# Patient Record
Sex: FEMALE | Race: WHITE | Employment: UNEMPLOYED | URBAN - METROPOLITAN AREA
[De-identification: names, ages, dates, MRNs, and addresses within clinical notes are randomized per-mention and may not be internally consistent; named-entity substitution may affect disease eponyms.]

---

## 2017-01-20 PROCEDURE — 87624 HPV HI-RISK TYP POOLED RSLT: CPT | Performed by: OBSTETRICS & GYNECOLOGY

## 2017-01-20 PROCEDURE — 88175 CYTOPATH C/V AUTO FLUID REDO: CPT | Performed by: OBSTETRICS & GYNECOLOGY

## 2017-02-08 RX ORDER — LEVOTHYROXINE SODIUM 0.03 MG/1
TABLET ORAL
Qty: 30 TABLET | Refills: 11 | Status: SHIPPED | OUTPATIENT
Start: 2017-02-08 | End: 2018-01-29

## 2017-02-21 ENCOUNTER — OFFICE VISIT (OUTPATIENT)
Dept: INTERNAL MEDICINE CLINIC | Facility: CLINIC | Age: 42
End: 2017-02-21

## 2017-02-21 VITALS
SYSTOLIC BLOOD PRESSURE: 120 MMHG | WEIGHT: 146 LBS | OXYGEN SATURATION: 99 % | DIASTOLIC BLOOD PRESSURE: 80 MMHG | RESPIRATION RATE: 12 BRPM | BODY MASS INDEX: 21.62 KG/M2 | HEIGHT: 69 IN | HEART RATE: 81 BPM

## 2017-02-21 DIAGNOSIS — Z00.00 ROUTINE PHYSICAL EXAMINATION: Primary | ICD-10-CM

## 2017-02-21 PROCEDURE — 99396 PREV VISIT EST AGE 40-64: CPT | Performed by: INTERNAL MEDICINE

## 2017-02-21 NOTE — PROGRESS NOTES
HPI:   Zoey Thomas is a 35year old female who presents for a complete physical exam.    Wt Readings from Last 6 Encounters:  02/21/17 : 146 lb  01/20/17 : 144 lb  12/19/16 : 145 lb  02/09/16 : 141 lb 12.8 oz  02/02/16 : 141 lb  11/30/15 : 144 lb ocular migraine   • Insomnia    • Shoulder injury 1/4/2008     right   • Obsessive-compulsive personality disorder    • Superficial phlebitis 6/17/2008     thrombosed, lower extremities   • Varicose veins 5/22/2008   • Migraine headache 7/20/2011   • Major 146 lb  BMI 21.55 kg/m2  SpO2 99%  Body mass index is 21.55 kg/(m^2).     GENERAL: well developed, well nourished,in no apparent distress  SKIN: no rashes,no suspicious lesions  HEENT: atraumatic, normocephalic,TM intact no erythema, oropharynx clear, poste

## 2017-03-18 ENCOUNTER — LAB ENCOUNTER (OUTPATIENT)
Dept: LAB | Facility: HOSPITAL | Age: 42
End: 2017-03-18
Attending: INTERNAL MEDICINE
Payer: COMMERCIAL

## 2017-03-18 DIAGNOSIS — Z00.00 ROUTINE PHYSICAL EXAMINATION: ICD-10-CM

## 2017-03-18 LAB
ALBUMIN SERPL-MCNC: 3.6 G/DL (ref 3.5–4.8)
ALP LIVER SERPL-CCNC: 43 U/L (ref 37–98)
ALT SERPL-CCNC: 30 U/L (ref 14–54)
AST SERPL-CCNC: 33 U/L (ref 15–41)
BASOPHILS # BLD AUTO: 0.01 X10(3) UL (ref 0–0.1)
BASOPHILS NFR BLD AUTO: 0.2 %
BILIRUB SERPL-MCNC: 0.5 MG/DL (ref 0.1–2)
BUN BLD-MCNC: 13 MG/DL (ref 8–20)
CALCIUM BLD-MCNC: 8.7 MG/DL (ref 8.3–10.3)
CHLORIDE: 107 MMOL/L (ref 101–111)
CHOLEST SMN-MCNC: 153 MG/DL (ref ?–200)
CO2: 28 MMOL/L (ref 22–32)
CREAT BLD-MCNC: 0.86 MG/DL (ref 0.55–1.02)
EOSINOPHIL # BLD AUTO: 0 X10(3) UL (ref 0–0.3)
EOSINOPHIL NFR BLD AUTO: 0 %
ERYTHROCYTE [DISTWIDTH] IN BLOOD BY AUTOMATED COUNT: 12.5 % (ref 11.5–16)
GLUCOSE BLD-MCNC: 91 MG/DL (ref 70–99)
HCT VFR BLD AUTO: 39.5 % (ref 34–50)
HDLC SERPL-MCNC: 90 MG/DL (ref 45–?)
HDLC SERPL: 1.7 {RATIO} (ref ?–4.44)
HGB BLD-MCNC: 13 G/DL (ref 12–16)
IMMATURE GRANULOCYTE COUNT: 0 X10(3) UL (ref 0–1)
IMMATURE GRANULOCYTE RATIO %: 0 %
LDLC SERPL CALC-MCNC: 56 MG/DL (ref ?–130)
LYMPHOCYTES # BLD AUTO: 2.16 X10(3) UL (ref 0.9–4)
LYMPHOCYTES NFR BLD AUTO: 52.8 %
M PROTEIN MFR SERPL ELPH: 7 G/DL (ref 6.1–8.3)
MCH RBC QN AUTO: 32.3 PG (ref 27–33.2)
MCHC RBC AUTO-ENTMCNC: 32.9 G/DL (ref 31–37)
MCV RBC AUTO: 98.3 FL (ref 81–100)
MONOCYTES # BLD AUTO: 0.44 X10(3) UL (ref 0.1–0.6)
MONOCYTES NFR BLD AUTO: 10.8 %
NEUTROPHIL ABS PRELIM: 1.48 X10 (3) UL (ref 1.3–6.7)
NEUTROPHILS # BLD AUTO: 1.48 X10(3) UL (ref 1.3–6.7)
NEUTROPHILS NFR BLD AUTO: 36.2 %
NONHDLC SERPL-MCNC: 63 MG/DL (ref ?–130)
PLATELET # BLD AUTO: 243 10(3)UL (ref 150–450)
POTASSIUM SERPL-SCNC: 4.1 MMOL/L (ref 3.6–5.1)
RBC # BLD AUTO: 4.02 X10(6)UL (ref 3.8–5.1)
RED CELL DISTRIBUTION WIDTH-SD: 45 FL (ref 35.1–46.3)
SODIUM SERPL-SCNC: 141 MMOL/L (ref 136–144)
TRIGLYCERIDES: 35 MG/DL (ref ?–150)
TSI SER-ACNC: 2.35 MIU/ML (ref 0.35–5.5)
VLDL: 7 MG/DL (ref 5–40)
WBC # BLD AUTO: 4.1 X10(3) UL (ref 4–13)

## 2017-03-18 PROCEDURE — 85025 COMPLETE CBC W/AUTO DIFF WBC: CPT

## 2017-03-18 PROCEDURE — 36415 COLL VENOUS BLD VENIPUNCTURE: CPT

## 2017-03-18 PROCEDURE — 80061 LIPID PANEL: CPT

## 2017-03-18 PROCEDURE — 84443 ASSAY THYROID STIM HORMONE: CPT

## 2017-03-18 PROCEDURE — 80053 COMPREHEN METABOLIC PANEL: CPT

## 2017-05-18 RX ORDER — RIZATRIPTAN BENZOATE 10 MG/1
TABLET ORAL
Qty: 9 TABLET | Refills: 1 | Status: SHIPPED | OUTPATIENT
Start: 2017-05-18 | End: 2017-08-21

## 2017-05-18 NOTE — TELEPHONE ENCOUNTER
Last OV pertinent to medication: 2/21/2017  Last refill date: 1/27/2016     #/refills: 9/5  When pt was asked to return for OV: 1 year  Upcoming appt/reason: none

## 2017-08-21 ENCOUNTER — TELEPHONE (OUTPATIENT)
Dept: INTERNAL MEDICINE CLINIC | Facility: CLINIC | Age: 42
End: 2017-08-21

## 2017-08-21 ENCOUNTER — OFFICE VISIT (OUTPATIENT)
Dept: INTERNAL MEDICINE CLINIC | Facility: CLINIC | Age: 42
End: 2017-08-21

## 2017-08-21 VITALS
HEIGHT: 69 IN | DIASTOLIC BLOOD PRESSURE: 80 MMHG | BODY MASS INDEX: 21.62 KG/M2 | HEART RATE: 68 BPM | TEMPERATURE: 98 F | WEIGHT: 146 LBS | SYSTOLIC BLOOD PRESSURE: 120 MMHG | OXYGEN SATURATION: 99 %

## 2017-08-21 DIAGNOSIS — R19.7 DIARRHEA, UNSPECIFIED TYPE: Primary | ICD-10-CM

## 2017-08-21 PROCEDURE — 99213 OFFICE O/P EST LOW 20 MIN: CPT | Performed by: INTERNAL MEDICINE

## 2017-08-21 NOTE — TELEPHONE ENCOUNTER
Spoke with pt. States that for the past 6 days she has been having 1-4 loose stools daily. Denies bleeding/black stools. Denies fever, nausea, vomiting, abdominal cramping.  Pt denies being out of the country but states that she may have eaten questionable

## 2017-08-21 NOTE — PROGRESS NOTES
Harriet Denise is a 38year old female.   HPI:   CC: diarrhea started last Tuesday  Came back from trip from [place] 1 week ago  This morning watery stools 4 to 5 times   Ate blueberry muffin, coffee, banana, yogurt yesterday  Today had yogurt, ban 7/20/2011   • Obsessive-compulsive personality disorder    • Onychomycosis 5/26/2011    chronic-8/13/2009   • Postpartum depression    • Seasonal allergies    • Shoulder injury 1/4/2008    right   • Superficial phlebitis 6/17/2008    thrombosed, lower extr encounter. There are no Patient Instructions on file for this visit. No Follow-up on file.

## 2017-08-21 NOTE — TELEPHONE ENCOUNTER
Patient phoned and advised that she has had diarrhea for 6 days that is staying pretty consistent, and she stated she may have \"salmonella\" food poisoning. Patient advised she is now getting concerned because she feels she may be getting dehydrated.   Pa

## 2017-09-26 ENCOUNTER — HOSPITAL ENCOUNTER (OUTPATIENT)
Dept: PHYSICAL THERAPY | Facility: HOSPITAL | Age: 42
Setting detail: THERAPIES SERIES
Discharge: HOME | End: 2017-09-26
Attending: ORTHOPAEDIC SURGERY
Payer: COMMERCIAL

## 2017-09-26 DIAGNOSIS — M70.61 TROCHANTERIC BURSITIS OF RIGHT HIP: ICD-10-CM

## 2017-09-26 DIAGNOSIS — M76.31 ILIOTIBIAL BAND SYNDROME OF RIGHT SIDE: ICD-10-CM

## 2017-09-26 PROCEDURE — 97162 PT EVAL MOD COMPLEX 30 MIN: CPT

## 2017-09-26 PROCEDURE — 97110 THERAPEUTIC EXERCISES: CPT

## 2017-09-26 NOTE — PROGRESS NOTES
LOWER EXTREMITY EVALUATION:   Referring Physician: Dr. La Harris  Diagnosis: R trochanteric bursitis, ITB syndrome     Date of Service: 9/26/2017     PATIENT SUMMARY   Saint Dame is a 38year old female who presents to therapy today with complaints o above impairments to decrease pain and return to prior level of function.     Precautions:  None  OBJECTIVE:   Gait: increased hip IR with stance phase, R>L  Palpation: TTP R greater trochanter  Sensation: intact to light touch  Myotomes: 5/5 L2-S1    AROM: total of 10 visits over a 90 day period. Treatment will include: Manual Therapy; Therapeutic Exercises; Neuromuscular Re-education;  Therapeutic Activity; Gait Training; Pt education; Home exercise program instructions    Education or treatment limitation:

## 2017-09-29 ENCOUNTER — HOSPITAL ENCOUNTER (OUTPATIENT)
Dept: PHYSICAL THERAPY | Facility: HOSPITAL | Age: 42
Setting detail: THERAPIES SERIES
Discharge: HOME | End: 2017-09-29
Attending: ORTHOPAEDIC SURGERY
Payer: COMMERCIAL

## 2017-09-29 PROCEDURE — 97110 THERAPEUTIC EXERCISES: CPT

## 2017-09-29 PROCEDURE — 97140 MANUAL THERAPY 1/> REGIONS: CPT

## 2017-09-29 NOTE — PROGRESS NOTES
Dx: R trochanteric bursitis, ITB syndrome            Next MD visit: none scheduled  Fall Risk: standard         Precautions: n/a             Subjective: Patient reports that her hip has felt fine since initial evaluation but that she has not tried running.

## 2017-10-03 ENCOUNTER — HOSPITAL ENCOUNTER (OUTPATIENT)
Dept: PHYSICAL THERAPY | Facility: HOSPITAL | Age: 42
Setting detail: THERAPIES SERIES
Discharge: HOME | End: 2017-10-03
Attending: ORTHOPAEDIC SURGERY
Payer: COMMERCIAL

## 2017-10-03 PROCEDURE — 97140 MANUAL THERAPY 1/> REGIONS: CPT

## 2017-10-03 PROCEDURE — 97110 THERAPEUTIC EXERCISES: CPT

## 2017-10-03 NOTE — PROGRESS NOTES
Dx: R trochanteric bursitis, ITB syndrome            Next MD visit: none scheduled  Fall Risk: standard         Precautions: n/a             Subjective: Patient reports that she feels only a very slight pain with walking but did feel a sharp pain when spri Treadmill running, 10 min, 6.5 X, 5 min         Prone hip PA w/ mob belt passive extension         1/2 kneel hip flexor stretch, 3x20 sec         glute med STM                   Charges: Danny 2( 25 min) manual x 1 ( 20 min)   Total Timed Treatment: 45 min

## 2017-10-06 ENCOUNTER — APPOINTMENT (OUTPATIENT)
Dept: PHYSICAL THERAPY | Facility: HOSPITAL | Age: 42
End: 2017-10-06
Attending: ORTHOPAEDIC SURGERY
Payer: COMMERCIAL

## 2017-10-10 ENCOUNTER — HOSPITAL ENCOUNTER (OUTPATIENT)
Dept: PHYSICAL THERAPY | Facility: HOSPITAL | Age: 42
Setting detail: THERAPIES SERIES
Discharge: HOME | End: 2017-10-10
Attending: ORTHOPAEDIC SURGERY
Payer: COMMERCIAL

## 2017-10-10 PROCEDURE — 97110 THERAPEUTIC EXERCISES: CPT

## 2017-10-10 PROCEDURE — 97140 MANUAL THERAPY 1/> REGIONS: CPT

## 2017-10-10 NOTE — PROGRESS NOTES
Dx: R trochanteric bursitis, ITB syndrome            Next MD visit: none scheduled  Fall Risk: standard         Precautions: n/a             Subjective: Patient was able to run on the treadmill for 3 miles on Friday without pain but then tried to run [employer] figure 4 hip PA, gr III+ Cable lateral walk, 7#, 10 Side lying hip abductor stretch, manual, 3x30 sec       Side lying hip abduction, 3x12 X Standing glute med stretch, 3x20 sec       Single leg bridge, 3x8 X Standing hip abduction in single leg, green TB,

## 2017-10-13 ENCOUNTER — APPOINTMENT (OUTPATIENT)
Dept: PHYSICAL THERAPY | Facility: HOSPITAL | Age: 42
End: 2017-10-13
Attending: ORTHOPAEDIC SURGERY
Payer: COMMERCIAL

## 2017-10-17 ENCOUNTER — HOSPITAL ENCOUNTER (OUTPATIENT)
Dept: PHYSICAL THERAPY | Facility: HOSPITAL | Age: 42
Setting detail: THERAPIES SERIES
Discharge: HOME | End: 2017-10-17
Attending: ORTHOPAEDIC SURGERY
Payer: COMMERCIAL

## 2017-10-17 PROCEDURE — 97140 MANUAL THERAPY 1/> REGIONS: CPT

## 2017-10-17 PROCEDURE — 97110 THERAPEUTIC EXERCISES: CPT

## 2017-10-17 NOTE — PROGRESS NOTES
Dx: R trochanteric bursitis, ITB syndrome            Next MD visit: none scheduled  Fall Risk: standard         Precautions: n/a             Subjective: Patient has been able to run on the treadmill up to 4 miles with no pain.  She does feel slight soreness 3x12 X Standing glute med stretch, 3x20 sec airex single leg march      Single leg bridge, 3x8 X Standing hip abduction in single leg, green TB, 2x10 airex single leg partial squat      2 leg lowering, 3x8 Long axis distraction manipulation Shuttle single

## 2017-10-24 ENCOUNTER — HOSPITAL ENCOUNTER (OUTPATIENT)
Dept: PHYSICAL THERAPY | Facility: HOSPITAL | Age: 42
Setting detail: THERAPIES SERIES
Discharge: HOME | End: 2017-10-24
Attending: ORTHOPAEDIC SURGERY
Payer: COMMERCIAL

## 2017-10-24 PROCEDURE — 97110 THERAPEUTIC EXERCISES: CPT

## 2017-10-24 PROCEDURE — 97140 MANUAL THERAPY 1/> REGIONS: CPT

## 2017-10-24 NOTE — PROGRESS NOTES
Dx: R trochanteric bursitis, ITB syndrome            Next MD visit: none scheduled  Fall Risk: standard         Precautions: n/a             Subjective: Patient has been able to run up to 6 miles, including running outside, but does have increased soreness bridge, 3x8 X Standing hip abduction in single leg, green TB, 2x10 airex single leg partial squat Walking lunge, 3 laps     2 leg lowering, 3x8 Long axis distraction manipulation Shuttle single leg jump, 37#, 2x15 BOSU single leg march Split lunge, 2x10

## 2017-10-31 ENCOUNTER — HOSPITAL ENCOUNTER (OUTPATIENT)
Dept: PHYSICAL THERAPY | Facility: HOSPITAL | Age: 42
Setting detail: THERAPIES SERIES
Discharge: HOME | End: 2017-10-31
Attending: ORTHOPAEDIC SURGERY
Payer: COMMERCIAL

## 2017-10-31 PROCEDURE — 97110 THERAPEUTIC EXERCISES: CPT

## 2017-10-31 PROCEDURE — 97140 MANUAL THERAPY 1/> REGIONS: CPT

## 2017-10-31 NOTE — PROGRESS NOTES
Dx: R trochanteric bursitis, ITB syndrome            Next MD visit: none scheduled  Fall Risk: standard         Precautions: n/a             Subjective: Patient had muscle soreness for 3 days following previous session but has less hip and low back pain.  Mago Aguillon leg partial squat Walking lunge, 3 laps X    2 leg lowering, 3x8 Long axis distraction manipulation Shuttle single leg jump, 37#, 2x15 BOSU single leg march Split lunge, 2x10 X, 2x10    Treadmill running, 10 min, 6.5 X, 5 min Single leg bridge, 3x10 BOSU s

## 2017-11-16 ENCOUNTER — OFFICE VISIT (OUTPATIENT)
Dept: INTERNAL MEDICINE CLINIC | Facility: CLINIC | Age: 42
End: 2017-11-16

## 2017-11-16 VITALS
HEIGHT: 69 IN | OXYGEN SATURATION: 99 % | BODY MASS INDEX: 22.22 KG/M2 | HEART RATE: 84 BPM | SYSTOLIC BLOOD PRESSURE: 100 MMHG | DIASTOLIC BLOOD PRESSURE: 70 MMHG | TEMPERATURE: 98 F | WEIGHT: 150 LBS

## 2017-11-16 DIAGNOSIS — J01.90 ACUTE SINUSITIS, RECURRENCE NOT SPECIFIED, UNSPECIFIED LOCATION: Primary | ICD-10-CM

## 2017-11-16 PROCEDURE — 99213 OFFICE O/P EST LOW 20 MIN: CPT | Performed by: INTERNAL MEDICINE

## 2017-11-16 RX ORDER — LEVOFLOXACIN 500 MG/1
500 TABLET, FILM COATED ORAL DAILY
Qty: 10 TABLET | Refills: 0 | Status: SHIPPED | OUTPATIENT
Start: 2017-11-16 | End: 2017-11-26

## 2017-11-16 NOTE — PROGRESS NOTES
Eli Vazquez is a 38year old female.   HPI:   CC: sinus symptoms  Using flonase  Last week, ear itching both ears  Increase ibuprofen  Feels pressure in ears/sinus  Lots of postnasal drip  Cough+  No sob  No fever          ALL:  Cat Dander [Dander] 2/4/2011    ocular migraine   • Varicose veins 5/22/2008     Past Surgical History:  2016: BREAST SURGERY      Comment: revision of bilat breast augmentation  1999: BREAST SURGERY PROCEDURE UNLISTED      Comment: breast augmentation  2013: BREAST SURGERY P (500 mg total) by mouth daily. Imaging & Consults:  None        No orders of the defined types were placed in this encounter. There are no Patient Instructions on file for this visit. No Follow-up on file.

## 2017-11-27 ENCOUNTER — HOSPITAL ENCOUNTER (EMERGENCY)
Facility: HOSPITAL | Age: 42
Discharge: HOME | End: 2017-11-27
Attending: EMERGENCY MEDICINE
Payer: COMMERCIAL

## 2017-11-27 ENCOUNTER — APPOINTMENT (OUTPATIENT)
Dept: GENERAL RADIOLOGY | Facility: HOSPITAL | Age: 42
End: 2017-11-27
Attending: EMERGENCY MEDICINE
Payer: COMMERCIAL

## 2017-11-27 ENCOUNTER — TELEPHONE (OUTPATIENT)
Dept: INTERNAL MEDICINE CLINIC | Facility: CLINIC | Age: 42
End: 2017-11-27

## 2017-11-27 VITALS
TEMPERATURE: 98 F | HEIGHT: 70 IN | BODY MASS INDEX: 21.47 KG/M2 | RESPIRATION RATE: 18 BRPM | HEART RATE: 70 BPM | WEIGHT: 150 LBS | OXYGEN SATURATION: 100 % | SYSTOLIC BLOOD PRESSURE: 126 MMHG | DIASTOLIC BLOOD PRESSURE: 79 MMHG

## 2017-11-27 DIAGNOSIS — M79.10 MUSCLE PAIN: Primary | ICD-10-CM

## 2017-11-27 PROCEDURE — 81025 URINE PREGNANCY TEST: CPT

## 2017-11-27 PROCEDURE — 99284 EMERGENCY DEPT VISIT MOD MDM: CPT

## 2017-11-27 PROCEDURE — 80048 BASIC METABOLIC PNL TOTAL CA: CPT | Performed by: EMERGENCY MEDICINE

## 2017-11-27 PROCEDURE — 80076 HEPATIC FUNCTION PANEL: CPT | Performed by: EMERGENCY MEDICINE

## 2017-11-27 PROCEDURE — 73030 X-RAY EXAM OF SHOULDER: CPT | Performed by: EMERGENCY MEDICINE

## 2017-11-27 PROCEDURE — 73080 X-RAY EXAM OF ELBOW: CPT | Performed by: EMERGENCY MEDICINE

## 2017-11-27 PROCEDURE — 36415 COLL VENOUS BLD VENIPUNCTURE: CPT

## 2017-11-27 PROCEDURE — 85025 COMPLETE CBC W/AUTO DIFF WBC: CPT | Performed by: EMERGENCY MEDICINE

## 2017-11-27 PROCEDURE — 82550 ASSAY OF CK (CPK): CPT | Performed by: EMERGENCY MEDICINE

## 2017-11-27 NOTE — ED PROVIDER NOTES
Patient Seen in: [facility] Emergency Department    History   Patient presents with:  Upper Extremity Injury (musculoskeletal)    Stated Complaint: arm pain after taking levaquin     HPI    Dario Javed is a pleasant 52-year-old female presenting t oz/week     Comment: 2-3 glasses wine weekly      Review of Systems    Positive for stated complaint: arm pain after taking levaquin   Other systems are as noted in HPI. Constitutional and vital signs reviewed.       All other systems reviewed and negative ---------                               -----------         ------                     CBC W/ DIFFERENTIAL[[id number]]          Normal              Final result                 Please view results for these tests on the individual orders.    POCT PREGNANCY

## 2017-11-27 NOTE — TELEPHONE ENCOUNTER
Pt called, upset and tearful. States that she finished the 10 days of Levaquin prescribed for her sinus symptoms on Saturday. States that sinus SX done. States that by Thursday she noticed some shoulder and elbow discomfort.   States that now her discomfor

## 2017-12-01 ENCOUNTER — TELEPHONE (OUTPATIENT)
Dept: INTERNAL MEDICINE CLINIC | Facility: CLINIC | Age: 42
End: 2017-12-01

## 2017-12-01 NOTE — TELEPHONE ENCOUNTER
Patient went to the [facility] Monday for a negative reaction to an antibiotic and is calling for a follow up.

## 2017-12-04 NOTE — TELEPHONE ENCOUNTER
Left message for patient to call back to schedule an ER/FU appointment with either Dr. Dwaine Farmer or Damon Meyers for this week.

## 2017-12-12 NOTE — TELEPHONE ENCOUNTER
Just FYI - patient cancelled her appt today for reaction to levaquin as she much better. She is going to give it another week as in her words it is a night and day difference from yesterday.

## 2017-12-27 ENCOUNTER — TELEPHONE (OUTPATIENT)
Dept: OTHER | Facility: CLINIC | Age: 42
End: 2017-12-27

## 2017-12-27 DIAGNOSIS — M79.644 PAIN OF FINGER OF RIGHT HAND: Primary | ICD-10-CM

## 2018-01-29 RX ORDER — LEVOTHYROXINE SODIUM 0.03 MG/1
TABLET ORAL
Qty: 30 TABLET | Refills: 1 | Status: SHIPPED | OUTPATIENT
Start: 2018-01-29 | End: 2018-04-10

## 2018-01-29 NOTE — TELEPHONE ENCOUNTER
Last OV relevant to medication: 11/16/17 Acute issue   Last refill date: 2/8/17     #/refills: 30 tabs + 1 refill   When pt was asked to return for OV: No mention follow up needed   Upcoming appt/reason: no upcoming appt     TSH   Date Value   03/18/2017 2

## 2018-03-21 ENCOUNTER — OFFICE VISIT (OUTPATIENT)
Dept: INTERNAL MEDICINE CLINIC | Facility: CLINIC | Age: 43
End: 2018-03-21

## 2018-03-21 VITALS
HEIGHT: 69 IN | OXYGEN SATURATION: 95 % | HEART RATE: 75 BPM | WEIGHT: 150 LBS | DIASTOLIC BLOOD PRESSURE: 70 MMHG | BODY MASS INDEX: 22.22 KG/M2 | SYSTOLIC BLOOD PRESSURE: 130 MMHG

## 2018-03-21 DIAGNOSIS — J32.9 RECURRENT SINUSITIS: Primary | ICD-10-CM

## 2018-03-21 PROCEDURE — 99213 OFFICE O/P EST LOW 20 MIN: CPT | Performed by: INTERNAL MEDICINE

## 2018-03-21 RX ORDER — FLUCONAZOLE 150 MG/1
150 TABLET ORAL ONCE
Qty: 1 TABLET | Refills: 0 | Status: SHIPPED | OUTPATIENT
Start: 2018-03-21 | End: 2018-03-21

## 2018-03-21 RX ORDER — CEFDINIR 300 MG/1
CAPSULE ORAL
Qty: 42 CAPSULE | Refills: 0 | Status: SHIPPED | OUTPATIENT
Start: 2018-03-21 | End: 2018-05-01 | Stop reason: ALTCHOICE

## 2018-03-21 RX ORDER — BENZONATATE 100 MG/1
CAPSULE ORAL
Qty: 40 CAPSULE | Refills: 0 | Status: SHIPPED | OUTPATIENT
Start: 2018-03-21 | End: 2018-05-01 | Stop reason: ALTCHOICE

## 2018-03-21 NOTE — PROGRESS NOTES
Alina Brownlee is a 38year old female.   HPI:   CC: sinus pressure and right ear pressure for 5 days  Pt has been using saline rinse emily pot TID, flonase  Sudafed  Last dose at noon  Her mucus is thick yellow  She is leaving to [place] on Sunday  Pt insertion   • Hypothyroidism    • Insomnia    • Major depressive disorder, recurrent episode, severe, without mention of psychotic behavior 10/12/2012   • Migraine headache 7/20/2011   • Obsessive-compulsive personality disorder    • Onychomycosis 5/26/201 murmur  LUNGS: clear to auscultation, breathing nonlabored    ASSESSMENT AND PLAN:   Recurrent sinusitis  (primary encounter diagnosis)- can do emily pot once a day while on vacation  Stay on flonase  Start cefdinir 300 bid for 21 days  Diflucan given in c

## 2018-03-24 ENCOUNTER — HOSPITAL ENCOUNTER (OUTPATIENT)
Age: 43
Discharge: HOME | End: 2018-03-24
Attending: FAMILY MEDICINE
Payer: COMMERCIAL

## 2018-03-24 VITALS
DIASTOLIC BLOOD PRESSURE: 94 MMHG | OXYGEN SATURATION: 98 % | HEIGHT: 69 IN | HEART RATE: 79 BPM | RESPIRATION RATE: 16 BRPM | BODY MASS INDEX: 21.48 KG/M2 | SYSTOLIC BLOOD PRESSURE: 137 MMHG | WEIGHT: 145 LBS | TEMPERATURE: 99 F

## 2018-03-24 DIAGNOSIS — R59.0 CERVICAL LYMPHADENOPATHY: ICD-10-CM

## 2018-03-24 DIAGNOSIS — J01.00 ACUTE NON-RECURRENT MAXILLARY SINUSITIS: Primary | ICD-10-CM

## 2018-03-24 PROCEDURE — 99204 OFFICE O/P NEW MOD 45 MIN: CPT

## 2018-03-24 PROCEDURE — 99213 OFFICE O/P EST LOW 20 MIN: CPT

## 2018-03-24 RX ORDER — PREDNISONE 20 MG/1
TABLET ORAL
Qty: 10 TABLET | Refills: 0 | Status: SHIPPED | OUTPATIENT
Start: 2018-03-24 | End: 2018-05-01 | Stop reason: ALTCHOICE

## 2018-03-24 NOTE — ED PROVIDER NOTES
Patient Seen in: [facility]    History   Patient presents with:  Sinus Problem  Swollen Glands    Stated Complaint: SINUS INFECTION- POSSIBLE MED REACTION?     HPI    This 38-year-old female presents to the office for rechec augmentation  1999: BREAST SURGERY PROCEDURE UNLISTED      Comment: breast augmentation  2013: BREAST SURGERY PROCEDURE UNLISTED      Comment: breast augmentation    Family history reviewed and is not pertinent to presenting problem.     Smoking status: [employer] Symptomatic treatment is reviewed with the patient. I will give her 5 days of prednisone 40 mg once daily. Usage and side effects are reviewed. She should continue the PEDERSEN BRIGID as prescribed by her primary doctor. Travel precautions reviewed.   The

## 2018-03-24 NOTE — ED INITIAL ASSESSMENT (HPI)
Pt arrived alert and responsive. Pt c/o sinus pain and swollen neck glands. Pt states she was seen by PMD on  Wednesday and prescribed cefdinir pt states she doesn't feel better.

## 2018-04-10 RX ORDER — LEVOTHYROXINE SODIUM 0.03 MG/1
TABLET ORAL
Qty: 30 TABLET | Refills: 0 | Status: SHIPPED | OUTPATIENT
Start: 2018-04-10 | End: 2018-05-02

## 2018-04-10 NOTE — TELEPHONE ENCOUNTER
Last OV relevant to medication: 02/21/17  Last refill date: 01/29/18   #/refills: #30 / 1 refill  When pt was asked to return for OV: CPX in one year  Upcoming appt/reason: None (see below)    Called the patient and informed her she is overdue for her year

## 2018-05-01 ENCOUNTER — OFFICE VISIT (OUTPATIENT)
Dept: INTERNAL MEDICINE CLINIC | Facility: CLINIC | Age: 43
End: 2018-05-01

## 2018-05-01 VITALS
BODY MASS INDEX: 22.22 KG/M2 | DIASTOLIC BLOOD PRESSURE: 80 MMHG | HEART RATE: 74 BPM | OXYGEN SATURATION: 97 % | HEIGHT: 69 IN | RESPIRATION RATE: 12 BRPM | WEIGHT: 150 LBS | SYSTOLIC BLOOD PRESSURE: 120 MMHG

## 2018-05-01 DIAGNOSIS — M77.12 LATERAL EPICONDYLITIS OF LEFT ELBOW: ICD-10-CM

## 2018-05-01 DIAGNOSIS — E03.9 ACQUIRED HYPOTHYROIDISM: ICD-10-CM

## 2018-05-01 DIAGNOSIS — B35.3 TINEA PEDIS OF BOTH FEET: ICD-10-CM

## 2018-05-01 DIAGNOSIS — M75.82 ROTATOR CUFF TENDINITIS, LEFT: ICD-10-CM

## 2018-05-01 DIAGNOSIS — J30.1 SEASONAL ALLERGIC RHINITIS DUE TO POLLEN: Primary | ICD-10-CM

## 2018-05-01 PROCEDURE — 99215 OFFICE O/P EST HI 40 MIN: CPT | Performed by: INTERNAL MEDICINE

## 2018-05-01 NOTE — PROGRESS NOTES
Jd Gonzalez is a 38year old female.   HPI:   CC: thyroid check and sinus recheck  Blowing yellow still from upper sinus  Doing emily pot, decongestant, flonase  Pt was given steroid last visit for sinus infection and 21 days of  Cefdinir  Saw Dr Yani Lundberg recurrent episode, severe, without mention of psychotic behavior 10/12/2012   • Migraine headache 7/20/2011   • Obsessive-compulsive personality disorder    • Onychomycosis 5/26/2011    chronic-8/13/2009   • Postpartum depression    • Seasonal allergies [id number] no S3S4 no murmur  LUNGS: clear to auscultation,   GI: Soft+BS NT ND,no masses, no HSM, no abdominal bruit , no hernia,   extrem- no swelling , decreased abduction left shoulder to 75 degrees  Lateral epicondyl mild tenderness  Peeling skin plantar

## 2018-05-02 ENCOUNTER — APPOINTMENT (OUTPATIENT)
Dept: LAB | Facility: HOSPITAL | Age: 43
End: 2018-05-02
Attending: INTERNAL MEDICINE
Payer: COMMERCIAL

## 2018-05-02 DIAGNOSIS — E03.9 ACQUIRED HYPOTHYROIDISM: ICD-10-CM

## 2018-05-02 PROCEDURE — 84443 ASSAY THYROID STIM HORMONE: CPT

## 2018-05-02 PROCEDURE — 36415 COLL VENOUS BLD VENIPUNCTURE: CPT

## 2018-09-11 PROCEDURE — 88175 CYTOPATH C/V AUTO FLUID REDO: CPT | Performed by: OBSTETRICS & GYNECOLOGY

## 2019-01-06 ENCOUNTER — HOSPITAL ENCOUNTER (OUTPATIENT)
Age: 44
Discharge: HOME | End: 2019-01-06
Attending: FAMILY MEDICINE
Payer: COMMERCIAL

## 2019-01-06 VITALS
RESPIRATION RATE: 16 BRPM | BODY MASS INDEX: 21.48 KG/M2 | TEMPERATURE: 99 F | HEART RATE: 84 BPM | HEIGHT: 69 IN | SYSTOLIC BLOOD PRESSURE: 125 MMHG | WEIGHT: 145 LBS | DIASTOLIC BLOOD PRESSURE: 85 MMHG | OXYGEN SATURATION: 98 %

## 2019-01-06 DIAGNOSIS — R19.7 ACUTE DIARRHEA: Primary | ICD-10-CM

## 2019-01-06 LAB
#LYMPHOCYTE IC: 2.1 X10ˆ3/UL (ref 0.9–3.2)
#MXD IC: 0.4 X10ˆ3/UL (ref 0.1–1)
#NEUTROPHIL IC: 2.9 X10ˆ3/UL (ref 1.3–6.7)
CREAT SERPL-MCNC: 0.7 MG/DL (ref 0.55–1.02)
GLUCOSE BLD-MCNC: 100 MG/DL (ref 70–99)
HCT IC: 41.8 % (ref 37–54)
HGB IC: 13.6 G/DL (ref 11.7–16)
ISTAT BUN: 7 MG/DL (ref 8–20)
ISTAT CHLORIDE: 102 MMOL/L (ref 101–111)
ISTAT HEMATOCRIT: 42 % (ref 34–50)
ISTAT IONIZED CALCIUM: 1.15 MMOL/L
ISTAT POTASSIUM: 4.2 MMOL/L (ref 3.6–5.1)
ISTAT SODIUM: 140 MMOL/L (ref 136–144)
LYMPHOCYTES NFR BLD AUTO: 39 %
MCH IC: 31.9 PG (ref 27–33.2)
MCHC IC: 32.5 G/DL (ref 31–37)
MCV IC: 97.9 FL (ref 81–100)
MIXED CELL %: 8.3 %
NEUTROPHILS NFR BLD AUTO: 52.7 %
PLT IC: 267 X10ˆ3/UL (ref 150–450)
RBC IC: 4.27 X10ˆ6/UL (ref 3.8–5.1)
WBC IC: 5.4 X10ˆ3/UL (ref 4–13)

## 2019-01-06 PROCEDURE — 85025 COMPLETE CBC W/AUTO DIFF WBC: CPT | Performed by: FAMILY MEDICINE

## 2019-01-06 PROCEDURE — 80047 BASIC METABLC PNL IONIZED CA: CPT

## 2019-01-06 PROCEDURE — 99213 OFFICE O/P EST LOW 20 MIN: CPT

## 2019-01-06 PROCEDURE — 36415 COLL VENOUS BLD VENIPUNCTURE: CPT

## 2019-01-06 NOTE — ED PROVIDER NOTES
Patient Seen in: [address] [facility]    History   Patient presents with:  Stomach Pain    Stated Complaint: stomach issues since xmas    HPI    75-year-old female presents with chief complaint of diarrhea for the past 10-12 days.   Jacklyn Sterling presenting problem. Social History    Tobacco Use      Smoking status: Never Smoker      Smokeless tobacco: Never Used    Alcohol use:  Yes      Alcohol/week: 0.0 oz      Comment: 2-3 glasses wine weekly    Drug use: No      Review of Systems    Positive diagnosis)    Disposition:  Discharge  1/6/2019 10:20 am    Follow-up:  Melissa Farrell  [address] [facility] [address] [phone]    Schedule an appointment as soon as possible for a visit in 1 week          Medications Prescribed

## 2019-01-06 NOTE — ED INITIAL ASSESSMENT (HPI)
Pt c/o stomach pain. States she had a bad stomach flu 12/23-12/25. Pt has been drinking a lot of water -  will eat in the morning and then within a couple hours will have to go to the bathroom emergently. Pt states some is formed and some is loose.

## 2019-04-22 ENCOUNTER — TELEPHONE (OUTPATIENT)
Dept: INTERNAL MEDICINE CLINIC | Facility: CLINIC | Age: 44
End: 2019-04-22

## 2019-04-22 RX ORDER — LEVOTHYROXINE SODIUM 0.03 MG/1
TABLET ORAL
Qty: 30 TABLET | Refills: 0 | OUTPATIENT
Start: 2019-04-22

## 2019-04-24 RX ORDER — LEVOTHYROXINE SODIUM 0.03 MG/1
TABLET ORAL
Qty: 30 TABLET | Refills: 0 | Status: SHIPPED | OUTPATIENT
Start: 2019-04-24 | End: 2019-05-21

## 2019-04-24 NOTE — TELEPHONE ENCOUNTER
Rx approved. Last OV May 2018 with Dr. Orlin Mtz. Pt agrees to continue here with Dr. Teresa Perdomo.  Made PE appt for 4/29/19 10am.

## 2019-04-24 NOTE — TELEPHONE ENCOUNTER
Pt following up regarding refill request stated she only has one left. Preferred Pharmacy: [facility] [address], [address] [facility] Meredith Bazan [phone], [phone]. Please advise. Thank you!

## 2019-04-29 ENCOUNTER — OFFICE VISIT (OUTPATIENT)
Dept: INTERNAL MEDICINE CLINIC | Facility: CLINIC | Age: 44
End: 2019-04-29
Payer: COMMERCIAL

## 2019-04-29 VITALS
RESPIRATION RATE: 14 BRPM | OXYGEN SATURATION: 99 % | TEMPERATURE: 98 F | WEIGHT: 152.63 LBS | SYSTOLIC BLOOD PRESSURE: 110 MMHG | HEIGHT: 69 IN | BODY MASS INDEX: 22.61 KG/M2 | DIASTOLIC BLOOD PRESSURE: 70 MMHG | HEART RATE: 84 BPM

## 2019-04-29 DIAGNOSIS — Z13.1 SCREENING FOR DIABETES MELLITUS: ICD-10-CM

## 2019-04-29 DIAGNOSIS — Z13.220 SCREENING FOR LIPOID DISORDERS: ICD-10-CM

## 2019-04-29 DIAGNOSIS — Z13.0 SCREENING FOR IRON DEFICIENCY ANEMIA: ICD-10-CM

## 2019-04-29 DIAGNOSIS — Z00.00 ENCOUNTER FOR ANNUAL PHYSICAL EXAM: Primary | ICD-10-CM

## 2019-04-29 DIAGNOSIS — Z23 NEED FOR VACCINATION: ICD-10-CM

## 2019-04-29 DIAGNOSIS — Z13.228 SCREENING FOR METABOLIC DISORDER: ICD-10-CM

## 2019-04-29 DIAGNOSIS — Z13.29 SCREENING FOR THYROID DISORDER: ICD-10-CM

## 2019-04-29 DIAGNOSIS — Z13.89 SCREENING FOR GENITOURINARY CONDITION: ICD-10-CM

## 2019-04-29 DIAGNOSIS — E55.9 VITAMIN D DEFICIENCY: ICD-10-CM

## 2019-04-29 PROCEDURE — 99396 PREV VISIT EST AGE 40-64: CPT | Performed by: STUDENT IN AN ORGANIZED HEALTH CARE EDUCATION/TRAINING PROGRAM

## 2019-04-29 PROCEDURE — 90471 IMMUNIZATION ADMIN: CPT | Performed by: STUDENT IN AN ORGANIZED HEALTH CARE EDUCATION/TRAINING PROGRAM

## 2019-04-29 PROCEDURE — 90715 TDAP VACCINE 7 YRS/> IM: CPT | Performed by: STUDENT IN AN ORGANIZED HEALTH CARE EDUCATION/TRAINING PROGRAM

## 2019-04-29 NOTE — PATIENT INSTRUCTIONS
Prevention Guidelines, Women Ages 36 to 52  Screening tests and vaccines are an important part of managing your health. A screening test is done to find possible disorders or diseases in people who don't have any symptoms.  The goal is to find a disease e Depression All women in this age group At routine exams   Gonorrhea Sexually active women at increased risk for infection At routine exams   Hepatitis C Anyone at increased risk; 1 time for those born between [facility] At routine exams   High cholester Meningococcal Women at increased risk for infection–talk with your healthcare provider 1 or more doses   Pneumococcal conjugate vaccine (PCV13) and pneumococcal polysaccharide vaccine (PPSV23) Women at increased risk for infection–talk with your healthcare

## 2019-04-29 NOTE — PROGRESS NOTES
[facility]    REASON FOR VISIT:    Lynnette Velazquez is a 37year old female who presents for an [address]. This is a new patient to me.    Patient's medications, allergies, past medical, surgical, social and family histories we Preventive Services for Which Recommendations Vary with Risk Recommendation Internal Lab or Procedure External Lab or Procedure   Cholesterol Screening Recommended screening varies with age, risk and gender LDL Cholesterol (mg/dL)   Date Value   03/18/ Rfl:    Rizatriptan Benzoate (MAXALT) 10 MG Oral Tab TAKE 1 TABLET BY MOUTH RIGHT AWAY, MAY REPEAT IN 2 HOURS ONCE Disp: 9 tablet Rfl: 5   fluticasone (FLONASE) 50 MCG/ACT Nasal Suspension 2 sprays in each nostril daily Disp: 1 Bottle Rfl: 3      MEDICAL I pain.    Eyes: Negative for pain and visual disturbance. Respiratory: Negative for cough and shortness of breath. Cardiovascular: Negative for chest pain and palpitations. Gastrointestinal: Negative for nausea, vomiting, abdominal pain and diarrhea. pulses. Lymphadenopathy: She has no cervical adenopathy or supraclavicular adenopathy. Neurological: She is alert and oriented to person, place, and time. Cranial nerves are intact,motor and sensory are grossly intact. She has normal reflexes.    Skin: S (14); Future    Screening for iron deficiency anemia  -     CBC WITH DIFFERENTIAL WITH PLATELET;  Future    Need for vaccination  -     TETANUS, DIPHTHERIA TOXOIDS AND ACELLULAR PERTUSIS VACCINE (TDAP), >7 YEARS, IM USE    Screening for genitourinary condit

## 2019-05-22 RX ORDER — LEVOTHYROXINE SODIUM 0.03 MG/1
TABLET ORAL
Qty: 30 TABLET | Refills: 0 | Status: SHIPPED | OUTPATIENT
Start: 2019-05-22 | End: 2019-06-20

## 2019-05-22 NOTE — TELEPHONE ENCOUNTER
Last OV relevant to medication: 4/29/19 PE  Last refill date: 4/24/19     #/refills: 30/0  When pt was asked to return for OV: 1 year PE  Upcoming appt/reason: none  TSH   Date Value   05/02/2018 2.150 mIU/mL   09/12/2013 2.04 mIU/L     TSH W/REFLEX TO FT4

## 2019-05-31 ENCOUNTER — OFFICE VISIT (OUTPATIENT)
Dept: INTERNAL MEDICINE CLINIC | Facility: CLINIC | Age: 44
End: 2019-05-31
Payer: COMMERCIAL

## 2019-05-31 VITALS
WEIGHT: 151 LBS | SYSTOLIC BLOOD PRESSURE: 122 MMHG | BODY MASS INDEX: 22.36 KG/M2 | OXYGEN SATURATION: 98 % | DIASTOLIC BLOOD PRESSURE: 76 MMHG | HEART RATE: 75 BPM | TEMPERATURE: 99 F | HEIGHT: 69 IN | RESPIRATION RATE: 16 BRPM

## 2019-05-31 DIAGNOSIS — J01.00 ACUTE MAXILLARY SINUSITIS, RECURRENCE NOT SPECIFIED: Primary | ICD-10-CM

## 2019-05-31 PROCEDURE — 99213 OFFICE O/P EST LOW 20 MIN: CPT | Performed by: STUDENT IN AN ORGANIZED HEALTH CARE EDUCATION/TRAINING PROGRAM

## 2019-05-31 RX ORDER — PREDNISONE 20 MG/1
20 TABLET ORAL 2 TIMES DAILY
Qty: 14 TABLET | Refills: 0 | Status: SHIPPED | OUTPATIENT
Start: 2019-05-31 | End: 2019-06-07

## 2019-05-31 RX ORDER — AMOXICILLIN AND CLAVULANATE POTASSIUM 875; 125 MG/1; MG/1
1 TABLET, FILM COATED ORAL 2 TIMES DAILY
Qty: 20 TABLET | Refills: 0 | Status: SHIPPED | OUTPATIENT
Start: 2019-05-31 | End: 2019-06-10

## 2019-05-31 NOTE — PROGRESS NOTES
HPI:    Patient ID: Asad Olvera is a 37year old female. Sinus Problem   This is a new problem. The current episode started in the past 7 days. The problem has been gradually worsening since onset. There has been no fever.  Her pain is at a pia capsule (150 mg total) by mouth daily. Disp: 30 capsule Rfl: 5   busPIRone HCl 5 MG Oral Tab Take 1 tablet (5 mg total) by mouth 3 (three) times daily.  Disp: 90 tablet Rfl: 5   ALPRAZolam 0.25 MG Oral Tab TAKE ONE TO TWO TABLETS BY MOUTH EVERY 4 TO 6 HOURS person, place, and time. She has normal reflexes. Skin: Skin is warm and dry. Psychiatric: She has a normal mood and affect. Her behavior is normal. Judgment and thought content normal.   Nursing note and vitals reviewed.              ASSESSMENT/PLAN:

## 2019-05-31 NOTE — PATIENT INSTRUCTIONS
Self-Care for Sinusitis     Drinking plenty of water can help sinuses drain. Sinusitis can often be managed with self-care. Self-care can keep sinuses moist and make you feel more comfortable. Remember to follow your doctor's instructions closely.  This Colds, flu, and allergies make it more likely for you to get sinusitis. Do your best to prevent sinusitis by preventing these problems. Do what you can to avoid getting colds and other infections. Stay away from things that cause allergies (allergens).  Vega Jimenez · Stay away from all types of smoke, which dries out sinus linings. This includes tobacco smoke and chemical smoke in workplace settings. · Use saltwater rinses. Date Last Reviewed: 10/1/2016  © 7053-9237 [employer].  [address] [facility] · You can use an over-the-counter decongestant, unless a similar medicine was prescribed to you. Nasal sprays work the fastest. Use one that contains phenylephrine or oxymetazoline. First blow your nose gently. Then use the spray.  Do not use these medicine · Don’t have close contact with people who have sore throats, colds, or other upper respiratory infections. · Don’t smoke, and stay away from secondhand smoke. · Stay up to date with of your vaccines.   Date Last Reviewed: 11/1/2017  © 7083-9932 [employer]

## 2019-06-10 ENCOUNTER — TELEPHONE (OUTPATIENT)
Dept: INTERNAL MEDICINE CLINIC | Facility: CLINIC | Age: 44
End: 2019-06-10

## 2019-06-10 NOTE — TELEPHONE ENCOUNTER
Pt saw Dr Yanni Infante 5-31 with sinus infection, still has sx, super clogged, congested, head ache. Only has 2 pills left, wondering if she should get refill of antibiotics, please advise.  Thank you

## 2019-06-10 NOTE — TELEPHONE ENCOUNTER
PSR - pt needs re-eval either here or [facility]. Cannot refill abx. Thanks! \"Return for If symptoms worsen or fail to improve. \"

## 2019-06-11 ENCOUNTER — OFFICE VISIT (OUTPATIENT)
Dept: INTERNAL MEDICINE CLINIC | Facility: CLINIC | Age: 44
End: 2019-06-11
Payer: COMMERCIAL

## 2019-06-11 VITALS
HEIGHT: 69 IN | SYSTOLIC BLOOD PRESSURE: 122 MMHG | HEART RATE: 81 BPM | BODY MASS INDEX: 22.22 KG/M2 | DIASTOLIC BLOOD PRESSURE: 78 MMHG | TEMPERATURE: 99 F | RESPIRATION RATE: 18 BRPM | WEIGHT: 150 LBS

## 2019-06-11 DIAGNOSIS — J01.00 ACUTE NON-RECURRENT MAXILLARY SINUSITIS: Primary | ICD-10-CM

## 2019-06-11 DIAGNOSIS — R09.81 SINUS CONGESTION: ICD-10-CM

## 2019-06-11 PROCEDURE — 99213 OFFICE O/P EST LOW 20 MIN: CPT | Performed by: NURSE PRACTITIONER

## 2019-06-11 RX ORDER — PREDNISONE 20 MG/1
40 TABLET ORAL DAILY
Qty: 10 TABLET | Refills: 0 | Status: SHIPPED | OUTPATIENT
Start: 2019-06-11 | End: 2019-06-28 | Stop reason: ALTCHOICE

## 2019-06-11 NOTE — PROGRESS NOTES
Patient presents with: Follow - Up: Pt states she does not feel like sinus infections is clear. Pt states she feel better but still concern infection is not over.        HPI:  Presents for follow up of recent visit with Dr. Carmen Paul for URI symptoms on 5/3 Hypothyroidism     Anxiety disorder     Chronic sinusitis     History of diabetes mellitus     Major depressive disorder, recurrent episode ([facility] [address]     Insomnia     Eczema     Conversion disorder     Adjustment disorder with anxiety     Obsessive-compulsive Conjunctivae are pink and moist without exudate or drainage. Lymphadenopathy: No cervical adenopathy. Cardiovascular: Normal rate, regular rhythm. No murmur. Pulmonary/Chest: No respiratory distress. Effort normal. Breath sounds clear bilaterally.  No

## 2019-06-11 NOTE — PATIENT INSTRUCTIONS
Try a premixed saline nasal spray, available over the counter, such as Ocean Nasal Spray, 4 times daily (may use every 2-3 hours if desired). Do two sprays each nostril and gently blow nose after.

## 2019-06-20 DIAGNOSIS — E03.9 ACQUIRED HYPOTHYROIDISM: Primary | ICD-10-CM

## 2019-06-21 RX ORDER — LEVOTHYROXINE SODIUM 0.03 MG/1
TABLET ORAL
Qty: 7 TABLET | Refills: 0 | Status: SHIPPED | OUTPATIENT
Start: 2019-06-21 | End: 2019-06-27

## 2019-06-21 NOTE — TELEPHONE ENCOUNTER
Last OV relevant to medication: 6/11/19  Last refill date: 5/22/19     #/refills: 30/0  When pt was asked to return for OV: none noted   Upcoming appt/reason: none    Left detailed message per HIPAA. Made aware to have lab work done as ordered.   30 days p

## 2019-06-21 NOTE — TELEPHONE ENCOUNTER
Patient returned message left. States she has been sick and unable to have labs drawn (saw Philip Howell 6/11/19 for sinusitis). States will run out medication before Monday. Advised message would be routed to physician, may not change course.  Patient states unabl

## 2019-06-21 NOTE — TELEPHONE ENCOUNTER
Spoke with pt. Made aware rx refilled for only 1 week to have labs drawn. Pt stated will have done asap.

## 2019-06-26 ENCOUNTER — LAB ENCOUNTER (OUTPATIENT)
Dept: LAB | Facility: HOSPITAL | Age: 44
End: 2019-06-26
Attending: STUDENT IN AN ORGANIZED HEALTH CARE EDUCATION/TRAINING PROGRAM
Payer: COMMERCIAL

## 2019-06-26 DIAGNOSIS — Z13.228 SCREENING FOR METABOLIC DISORDER: ICD-10-CM

## 2019-06-26 DIAGNOSIS — Z13.0 SCREENING FOR IRON DEFICIENCY ANEMIA: ICD-10-CM

## 2019-06-26 DIAGNOSIS — Z13.220 SCREENING FOR LIPOID DISORDERS: ICD-10-CM

## 2019-06-26 DIAGNOSIS — Z13.29 SCREENING FOR THYROID DISORDER: ICD-10-CM

## 2019-06-26 DIAGNOSIS — Z13.89 SCREENING FOR GENITOURINARY CONDITION: ICD-10-CM

## 2019-06-26 DIAGNOSIS — Z13.1 SCREENING FOR DIABETES MELLITUS: ICD-10-CM

## 2019-06-26 DIAGNOSIS — E55.9 VITAMIN D DEFICIENCY: ICD-10-CM

## 2019-06-26 PROCEDURE — 84443 ASSAY THYROID STIM HORMONE: CPT

## 2019-06-26 PROCEDURE — 85025 COMPLETE CBC W/AUTO DIFF WBC: CPT

## 2019-06-26 PROCEDURE — 36415 COLL VENOUS BLD VENIPUNCTURE: CPT

## 2019-06-26 PROCEDURE — 80061 LIPID PANEL: CPT

## 2019-06-26 PROCEDURE — 83036 HEMOGLOBIN GLYCOSYLATED A1C: CPT

## 2019-06-26 PROCEDURE — 82306 VITAMIN D 25 HYDROXY: CPT

## 2019-06-26 PROCEDURE — 80053 COMPREHEN METABOLIC PANEL: CPT

## 2019-06-26 PROCEDURE — 81003 URINALYSIS AUTO W/O SCOPE: CPT

## 2019-06-27 ENCOUNTER — TELEPHONE (OUTPATIENT)
Dept: INTERNAL MEDICINE CLINIC | Facility: CLINIC | Age: 44
End: 2019-06-27

## 2019-06-27 DIAGNOSIS — E03.9 ACQUIRED HYPOTHYROIDISM: ICD-10-CM

## 2019-06-27 RX ORDER — LEVOTHYROXINE SODIUM 0.03 MG/1
TABLET ORAL
Qty: 90 TABLET | Refills: 0 | Status: SHIPPED | OUTPATIENT
Start: 2019-06-27 | End: 2019-09-21

## 2019-06-27 NOTE — TELEPHONE ENCOUNTER
Patient was put on amoxicillin and prednisone starting 5/31/9 then took another round of prednisone on 6/11/19 from our office.   The patient had a upset stomach from this, thought it was the antibiotics, then thought maybe because it was from her menstrual

## 2019-06-27 NOTE — TELEPHONE ENCOUNTER
Received return phone call from patient. Patient states she has been on antibiotics and prednisone and her stomach has been \"upset\" for 2 or 3 weeks now, she thinks because of the medication.  Patient states she took a probiotic, but it has not really hel

## 2019-06-28 ENCOUNTER — OFFICE VISIT (OUTPATIENT)
Dept: INTERNAL MEDICINE CLINIC | Facility: CLINIC | Age: 44
End: 2019-06-28
Payer: COMMERCIAL

## 2019-06-28 VITALS
BODY MASS INDEX: 22.22 KG/M2 | HEART RATE: 76 BPM | DIASTOLIC BLOOD PRESSURE: 60 MMHG | WEIGHT: 150 LBS | SYSTOLIC BLOOD PRESSURE: 106 MMHG | HEIGHT: 69 IN | OXYGEN SATURATION: 99 % | TEMPERATURE: 98 F | RESPIRATION RATE: 14 BRPM

## 2019-06-28 DIAGNOSIS — R10.84 ABDOMINAL DISCOMFORT, GENERALIZED: Primary | ICD-10-CM

## 2019-06-28 DIAGNOSIS — R73.03 PREDIABETES: ICD-10-CM

## 2019-06-28 PROCEDURE — 99214 OFFICE O/P EST MOD 30 MIN: CPT | Performed by: STUDENT IN AN ORGANIZED HEALTH CARE EDUCATION/TRAINING PROGRAM

## 2019-06-28 RX ORDER — PANTOPRAZOLE SODIUM 40 MG/1
40 TABLET, DELAYED RELEASE ORAL
Qty: 30 TABLET | Refills: 0 | Status: SHIPPED | OUTPATIENT
Start: 2019-06-28 | End: 2020-07-29 | Stop reason: ALTCHOICE

## 2019-06-28 NOTE — PROGRESS NOTES
[facility]    REASON FOR VISIT:    Current Complaints: Patient presents with:  Stomach Pain: not liquid or solid but looser stools, stomahc feels swollen, yesterday she went to the bathroom 4-5 times      HPI:  Jd Manivy is a 37 year o ONE TIME DAILY Disp: 90 tablet Rfl: 0   ALPRAZolam 0.25 MG Oral Tab TAKE 1-2 TABLETS EVERY 4-6 HOURS AS NEEDED Disp: 120 tablet Rfl: 0   Zolpidem Tartrate 10 MG Oral Tab TAKE ONE TABLET BY MOUTH NIGHTLY AS NEEDED FOR SLEEP Disp: 30 tablet Rfl: 5   Venlafax Appears stated age, nourished, and pleasant. Vital signs reviewed as noted   Head: Normocephalic and atraumatic. HEENT: Nose normal. TMs pearly gray, + light reflex. B/l external auditory canals without erythema or edema.   Mucous membranes moist, dentiti family history of type 2 diabetes mellitus: Both parents have type 2 diabetes. Patient was reassured but this number can definitely be changed to lower if she continues healthy lifestyle and regular exercise.       Medications side effects, risk and benefi

## 2019-09-21 DIAGNOSIS — E03.9 ACQUIRED HYPOTHYROIDISM: ICD-10-CM

## 2019-09-24 RX ORDER — LEVOTHYROXINE SODIUM 0.03 MG/1
TABLET ORAL
Qty: 90 TABLET | Refills: 2 | Status: SHIPPED | OUTPATIENT
Start: 2019-09-24 | End: 2019-12-23

## 2019-12-21 DIAGNOSIS — E03.9 ACQUIRED HYPOTHYROIDISM: ICD-10-CM

## 2019-12-23 RX ORDER — LEVOTHYROXINE SODIUM 0.03 MG/1
TABLET ORAL
Qty: 90 TABLET | Refills: 1 | Status: SHIPPED | OUTPATIENT
Start: 2019-12-23 | End: 2020-06-15

## 2020-06-15 DIAGNOSIS — E03.9 ACQUIRED HYPOTHYROIDISM: ICD-10-CM

## 2020-06-15 RX ORDER — LEVOTHYROXINE SODIUM 0.03 MG/1
25 TABLET ORAL DAILY
Qty: 90 TABLET | Refills: 0 | Status: SHIPPED | OUTPATIENT
Start: 2020-06-15 | End: 2020-09-18

## 2020-07-29 ENCOUNTER — OFFICE VISIT (OUTPATIENT)
Dept: INTERNAL MEDICINE CLINIC | Facility: CLINIC | Age: 45
End: 2020-07-29
Payer: COMMERCIAL

## 2020-07-29 VITALS
SYSTOLIC BLOOD PRESSURE: 124 MMHG | HEIGHT: 69 IN | BODY MASS INDEX: 22.66 KG/M2 | TEMPERATURE: 97 F | RESPIRATION RATE: 14 BRPM | DIASTOLIC BLOOD PRESSURE: 80 MMHG | WEIGHT: 153 LBS | HEART RATE: 90 BPM | OXYGEN SATURATION: 98 %

## 2020-07-29 DIAGNOSIS — Z13.220 SCREENING FOR CHOLESTEROL LEVEL: ICD-10-CM

## 2020-07-29 DIAGNOSIS — H60.391 OTHER INFECTIVE ACUTE OTITIS EXTERNA OF RIGHT EAR: Primary | ICD-10-CM

## 2020-07-29 DIAGNOSIS — Z13.29 SCREENING FOR THYROID DISORDER: ICD-10-CM

## 2020-07-29 DIAGNOSIS — Z00.00 PHYSICAL EXAM: ICD-10-CM

## 2020-07-29 PROCEDURE — 3008F BODY MASS INDEX DOCD: CPT | Performed by: NURSE PRACTITIONER

## 2020-07-29 PROCEDURE — 3079F DIAST BP 80-89 MM HG: CPT | Performed by: NURSE PRACTITIONER

## 2020-07-29 PROCEDURE — 99213 OFFICE O/P EST LOW 20 MIN: CPT | Performed by: NURSE PRACTITIONER

## 2020-07-29 PROCEDURE — 3074F SYST BP LT 130 MM HG: CPT | Performed by: NURSE PRACTITIONER

## 2020-07-29 NOTE — PATIENT INSTRUCTIONS
Get your labs done. You should be fasting for at least 10 hours. If you take a multivitamin with Biotin or any biotin product it should be held for 3 days prior to getting your labs done.  Call  to make an apt    Follow up in one week as needed

## 2020-07-29 NOTE — PROGRESS NOTES
Erick Pedraza is a 50year old female. CHIEF COMPLAINT   Right ear pain    HPI:   The patient reports the pain started on Sunday to her right ear. She states it is intermittent, sharp 10/10.  She was outside on Saturday and using her ear buds and no headache 7/20/2011   • Obsessive-compulsive personality disorder [facility])    • Onychomycosis 5/26/2011    chronic-8/13/2009   • Postpartum depression    • Seasonal allergies    • Shoulder injury 1/4/2008    right   • Superficial phlebitis 6/17/2008    thrombo Value Date    GLU 92 06/26/2019    BUN 14 06/26/2019    BUNCREA 14.9 06/26/2019    CREATSERUM 0.94 06/26/2019    ANIONGAP 7 06/26/2019    GFR 70 11/27/2017    GFRNAA 74 06/26/2019    GFRAA 85 06/26/2019    CA 9.0 06/26/2019    OSMOCALC 298 (H) 06/26/2019

## 2020-09-17 DIAGNOSIS — E03.9 ACQUIRED HYPOTHYROIDISM: ICD-10-CM

## 2020-09-18 NOTE — TELEPHONE ENCOUNTER
LM's for patient to call back and schedule her Physical appointment to release refill request.      Stated to Complete Fasting Labs on Order Prior to Appointment.     Last OV relevant to medication: 7/29/2020  Last refill date:  6/15/2020    #/refills: 90/0

## 2020-09-23 RX ORDER — LEVOTHYROXINE SODIUM 0.03 MG/1
25 TABLET ORAL DAILY
Qty: 30 TABLET | Refills: 0 | Status: SHIPPED | OUTPATIENT
Start: 2020-09-23 | End: 2021-04-09

## 2021-03-04 ENCOUNTER — MED REC SCAN ONLY (OUTPATIENT)
Dept: INTERNAL MEDICINE CLINIC | Facility: CLINIC | Age: 46
End: 2021-03-04

## 2021-04-09 DIAGNOSIS — E03.9 ACQUIRED HYPOTHYROIDISM: ICD-10-CM

## 2021-04-09 RX ORDER — LEVOTHYROXINE SODIUM 0.03 MG/1
25 TABLET ORAL DAILY
Qty: 30 TABLET | Refills: 0 | Status: SHIPPED | OUTPATIENT
Start: 2021-04-09 | End: 2021-05-12

## 2021-04-09 NOTE — TELEPHONE ENCOUNTER
Patient given one time 30 day supply. Patient needs labs for further refills. Spoke to pt. Advised only 30 day supply given. Labs overdue since July 2020. Pt voiced understanding.   Scheduled pt for lab appt on 4/12/21 at 7:30AM.  Reminded to fast

## 2021-04-09 NOTE — TELEPHONE ENCOUNTER
Did the patient contact the pharmacy directly?:  Yes    Is patient out of meds or supply very low?:  Out of meds    Medication Requested:  LEVOTHYROXINE SODIUM 25 MCG Oral Tab    Is patient requesting a 30 or 90 day supply?:  90 day supply    Pharmacy name

## 2021-04-12 ENCOUNTER — LAB ENCOUNTER (OUTPATIENT)
Dept: LAB | Age: 46
End: 2021-04-12
Attending: NURSE PRACTITIONER
Payer: COMMERCIAL

## 2021-04-12 DIAGNOSIS — Z00.00 PHYSICAL EXAM: ICD-10-CM

## 2021-04-12 DIAGNOSIS — Z13.220 SCREENING FOR CHOLESTEROL LEVEL: ICD-10-CM

## 2021-04-12 DIAGNOSIS — Z13.29 SCREENING FOR THYROID DISORDER: ICD-10-CM

## 2021-04-12 PROCEDURE — 80050 GENERAL HEALTH PANEL: CPT | Performed by: NURSE PRACTITIONER

## 2021-04-12 PROCEDURE — 80061 LIPID PANEL: CPT | Performed by: NURSE PRACTITIONER

## 2021-04-12 PROCEDURE — 36415 COLL VENOUS BLD VENIPUNCTURE: CPT | Performed by: NURSE PRACTITIONER

## 2021-04-14 ENCOUNTER — OFFICE VISIT (OUTPATIENT)
Dept: INTERNAL MEDICINE CLINIC | Facility: CLINIC | Age: 46
End: 2021-04-14
Payer: COMMERCIAL

## 2021-04-14 VITALS
BODY MASS INDEX: 22.39 KG/M2 | OXYGEN SATURATION: 98 % | HEIGHT: 69 IN | DIASTOLIC BLOOD PRESSURE: 78 MMHG | SYSTOLIC BLOOD PRESSURE: 122 MMHG | HEART RATE: 76 BPM | TEMPERATURE: 98 F | RESPIRATION RATE: 16 BRPM | WEIGHT: 151.19 LBS

## 2021-04-14 DIAGNOSIS — Z00.00 ENCOUNTER FOR ANNUAL PHYSICAL EXAM: Primary | ICD-10-CM

## 2021-04-14 DIAGNOSIS — Z12.31 ENCOUNTER FOR SCREENING MAMMOGRAM FOR BREAST CANCER: ICD-10-CM

## 2021-04-14 PROCEDURE — 3008F BODY MASS INDEX DOCD: CPT | Performed by: STUDENT IN AN ORGANIZED HEALTH CARE EDUCATION/TRAINING PROGRAM

## 2021-04-14 PROCEDURE — 99396 PREV VISIT EST AGE 40-64: CPT | Performed by: STUDENT IN AN ORGANIZED HEALTH CARE EDUCATION/TRAINING PROGRAM

## 2021-04-14 PROCEDURE — 3074F SYST BP LT 130 MM HG: CPT | Performed by: STUDENT IN AN ORGANIZED HEALTH CARE EDUCATION/TRAINING PROGRAM

## 2021-04-14 PROCEDURE — 3078F DIAST BP <80 MM HG: CPT | Performed by: STUDENT IN AN ORGANIZED HEALTH CARE EDUCATION/TRAINING PROGRAM

## 2021-04-14 RX ORDER — TERBINAFINE HYDROCHLORIDE 250 MG/1
1 TABLET ORAL DAILY
COMMUNITY
Start: 2021-03-29

## 2021-04-14 NOTE — PROGRESS NOTES
[facility]    REASON FOR VISIT:    Zoey Thomas is a 50year old female who presents for an [address]. Current Complaints: feeling well. Reports compliance with the medications, denies any side effects.    Vaccinations: T Diabetes Screening  if history of high blood pressure or other  risk factors HgbA1C (%)   Date Value   06/26/2019 5.7 (H)     Glucose (mg/dL)   Date Value   04/12/2021 91     GLUCOSE (mg/dL)   Date Value   02/04/2016 92         Gonorrhea Screening if hig not taking: Reported on 4/14/2021) 9 tablet 5      MEDICAL INFORMATION:   Past Medical History:   Diagnosis Date   • Chronic sinusitis 6/8/2012 4/17/2012: recurrent   • Generalized anxiety disorder 10/12/2012   • Hip pain 5/11/2012    B/L, tendinitis; 7 for chest pain and palpitations. Gastrointestinal: Negative for nausea, vomiting, abdominal pain and diarrhea. Genitourinary: Negative for urgency, frequency and difficulty urinating. Musculoskeletal: Negative for arthralgias and gait problem.    Skin to pt's preference. Abdominal: Soft. Bowel sounds are normal. There is no tenderness. Musculoskeletal: Normal range of motion. She exhibits no edema. Extremities: no cyanosis, clubbing or edema, no varicosities or stasis change, 2+DP/PT pulses.   Lymph MIRYAM (CPT=77067/64116)       Continue healthy lifestyle    Discussed use of sunscreen, wearing seatbelt, recommend regular cardiovascular and weight bearing exercise as well as a well-rounded diet.      Her 5 year prevention plan includes: annual visits fo

## 2021-04-14 NOTE — PATIENT INSTRUCTIONS
Prevention Guidelines, Women Ages 36 to 52  Screening tests and vaccines are an important part of managing your health. A screening test is done to find diseases in people who don't have any symptoms.  The goal is to find a disease early so lifestyle mancilla sigmoidoscopy every 5 years, or  · Colonoscopy every 10 years, or  · CT colonography (virtual colonoscopy) every 5 years, or  · Yearly fecal occult blood test, or  · Yearly fecal immunochemical test every year, or  · Stool DNA test, every 3 years  If you c least 4 weeks after the first dose   Hepatitis A Women at increased risk for infection–talk with your healthcare provider 2 doses given 6 months apart   Hepatitis B Women at increased risk for infection–talk with your healthcare provider 3 doses over 6 mon American Academy of Ophthalmology  Tito last reviewed this educational content on 11/1/2017  © 9783-7171 [employer]. All rights reserved. This information is not intended as a substitute for professional medical care.  Always follow your

## 2021-04-23 ENCOUNTER — TELEMEDICINE (OUTPATIENT)
Dept: INTERNAL MEDICINE CLINIC | Facility: CLINIC | Age: 46
End: 2021-04-23

## 2021-04-23 ENCOUNTER — TELEPHONE (OUTPATIENT)
Dept: INTERNAL MEDICINE CLINIC | Facility: CLINIC | Age: 46
End: 2021-04-23

## 2021-04-23 VITALS — HEIGHT: 69 IN | HEART RATE: 68 BPM | TEMPERATURE: 100 F | BODY MASS INDEX: 22 KG/M2

## 2021-04-23 DIAGNOSIS — B34.9 ACUTE VIRAL SYNDROME: ICD-10-CM

## 2021-04-23 DIAGNOSIS — J01.00 ACUTE NON-RECURRENT MAXILLARY SINUSITIS: Primary | ICD-10-CM

## 2021-04-23 PROCEDURE — 99213 OFFICE O/P EST LOW 20 MIN: CPT | Performed by: NURSE PRACTITIONER

## 2021-04-23 RX ORDER — AMOXICILLIN AND CLAVULANATE POTASSIUM 875; 125 MG/1; MG/1
1 TABLET, FILM COATED ORAL 2 TIMES DAILY
Qty: 20 TABLET | Refills: 0 | Status: SHIPPED | OUTPATIENT
Start: 2021-04-23 | End: 2021-05-03

## 2021-04-23 NOTE — TELEPHONE ENCOUNTER
Virtual/Telephone Consent    Marinaadarsh Thomas verbally consents to a Virtual/Telephone Check-In service on 04/23/2021.   Patient understands and accepts financial responsibility for any deductible, co-insurance and/or co-pays associated with this servic

## 2021-04-23 NOTE — PROGRESS NOTES
Virtual video Check-In    Lynnette Velazquez verbally consents to a Virtual/video Check-In visit on 04/23/21. Patient has been referred to the [facility] website at [email or web address] to review the yearly Consent to Treat document.     Patient understand tablet (5 mg total) by mouth 3 (three) times daily. 90 tablet 5   • Venlafaxine HCl  MG Oral Capsule SR 24 Hr Take 1 capsule (150 mg total) by mouth daily.  30 capsule 5   • Zolpidem Tartrate 10 MG Oral Tab Take 1 tablet (10 mg total) by mouth nightly are able to phonate clearly without pausing due to sob, there was no coughing during the visit.   NEURO: Oriented times three      LABS:      Lab Results   Component Value Date    WBC 4.5 04/12/2021    RBC 3.95 04/12/2021    HGB 12.6 04/12/2021    HCT 40.3 and quarantine precautions until her PCR test result is in.  - Amoxicillin-Pot Clavulanate 875-125 MG Oral Tab; Take 1 tablet by mouth 2 (two) times daily for 10 days. Dispense: 20 tablet;  Refill: 0      The patient indicates understanding of these issues

## 2021-04-23 NOTE — PATIENT INSTRUCTIONS
Continue to quarantine until your PCR Covid test result is in. If negative you may discontinue quarantine. If positive follow CDC guidelines of 10 days from onset of symptoms and 3 days of improvement in your symptoms before discontinuing isolation.     Vinicio Gan You have a viral upper respiratory illness (URI), which is another term for the common cold. This illness is contagious during the first few days. It is spread through the air by coughing and sneezing.  It may also be spread by direct contact (touching the pressure.)  Follow-up care  Follow up with your healthcare provider, or as advised.   When to seek medical advice  Call your healthcare provider right away if any of these occur:  · Cough with lots of colored sputum (mucus)  · Severe headache; face, neck, o

## 2021-05-11 DIAGNOSIS — E03.9 ACQUIRED HYPOTHYROIDISM: ICD-10-CM

## 2021-05-12 RX ORDER — LEVOTHYROXINE SODIUM 0.03 MG/1
25 TABLET ORAL DAILY
Qty: 90 TABLET | Refills: 0 | Status: SHIPPED | OUTPATIENT
Start: 2021-05-12 | End: 2021-08-10

## 2021-05-12 NOTE — TELEPHONE ENCOUNTER
Last refill #30 on 4/9/2021  Last office visit pertaining to refill on 4/14/2021 - cpx  No future appointments.   Lab Results   Component Value Date    TSH 3.280 04/12/2021    TSHT4 2.46 02/04/2016     Refilled per protocol

## 2021-05-14 RX ORDER — RIZATRIPTAN BENZOATE 10 MG/1
TABLET ORAL
Qty: 9 TABLET | Refills: 0 | Status: SHIPPED | OUTPATIENT
Start: 2021-05-14

## 2021-06-18 ENCOUNTER — MED REC SCAN ONLY (OUTPATIENT)
Dept: INTERNAL MEDICINE CLINIC | Facility: CLINIC | Age: 46
End: 2021-06-18

## 2021-07-12 ENCOUNTER — HOSPITAL ENCOUNTER (OUTPATIENT)
Dept: MAMMOGRAPHY | Facility: HOSPITAL | Age: 46
Discharge: HOME | End: 2021-07-12
Attending: STUDENT IN AN ORGANIZED HEALTH CARE EDUCATION/TRAINING PROGRAM
Payer: COMMERCIAL

## 2021-07-12 DIAGNOSIS — Z12.31 ENCOUNTER FOR SCREENING MAMMOGRAM FOR BREAST CANCER: ICD-10-CM

## 2021-07-12 PROCEDURE — 77063 BREAST TOMOSYNTHESIS BI: CPT | Performed by: STUDENT IN AN ORGANIZED HEALTH CARE EDUCATION/TRAINING PROGRAM

## 2021-07-12 PROCEDURE — 77067 SCR MAMMO BI INCL CAD: CPT | Performed by: STUDENT IN AN ORGANIZED HEALTH CARE EDUCATION/TRAINING PROGRAM

## 2021-08-09 DIAGNOSIS — E03.9 ACQUIRED HYPOTHYROIDISM: ICD-10-CM

## 2021-08-10 RX ORDER — LEVOTHYROXINE SODIUM 0.03 MG/1
25 TABLET ORAL DAILY
Qty: 90 TABLET | Refills: 2 | Status: SHIPPED | OUTPATIENT
Start: 2021-08-10

## 2021-08-10 NOTE — TELEPHONE ENCOUNTER
Refilled per protocol.       4/14/21 OV note:   Return in about 1 year (around 4/14/2022) for Physical.    Component   Ref Range & Units 4/12/21  7:55 AM   TSH   0.358 - 3.740 mIU/mL 3.280

## 2021-12-23 ENCOUNTER — HOSPITAL ENCOUNTER (OUTPATIENT)
Age: 46
Discharge: HOME | End: 2021-12-23
Payer: COMMERCIAL

## 2021-12-23 VITALS
DIASTOLIC BLOOD PRESSURE: 94 MMHG | RESPIRATION RATE: 16 BRPM | SYSTOLIC BLOOD PRESSURE: 139 MMHG | TEMPERATURE: 98 F | OXYGEN SATURATION: 96 % | HEART RATE: 83 BPM | BODY MASS INDEX: 22 KG/M2 | WEIGHT: 148 LBS

## 2021-12-23 DIAGNOSIS — Z20.822 ENCOUNTER FOR LABORATORY TESTING FOR COVID-19 VIRUS: Primary | ICD-10-CM

## 2021-12-23 PROCEDURE — 99212 OFFICE O/P EST SF 10 MIN: CPT | Performed by: NURSE PRACTITIONER

## 2021-12-23 NOTE — ED PROVIDER NOTES
Patient Seen in: Immediate [address] [place] [address]      History   Patient presents with:  Covid-19 Test    Stated Complaint: exposure     Subjective: There is a 42-year-old female with below stated medical history.   Presents to immediate care for Covid–1 Cardiovascular: Negative for chest pain, palpitations and leg swelling. Gastrointestinal: Negative for abdominal pain, diarrhea, nausea and vomiting. Genitourinary: Negative for dysuria.    Musculoskeletal: Negative for back pain, neck pain and neck s Findings: No rash. Neurological:      Mental Status: She is alert and oriented to person, place, and time.    Psychiatric:         Mood and Affect: Mood normal.         Behavior: Behavior normal.               ED Course     Labs Reviewed - No data to [facility]

## 2022-05-02 RX ORDER — LEVOTHYROXINE SODIUM 0.03 MG/1
TABLET ORAL
Qty: 30 TABLET | Refills: 0 | Status: SHIPPED | OUTPATIENT
Start: 2022-05-02

## 2022-05-03 ENCOUNTER — OFFICE VISIT (OUTPATIENT)
Dept: INTERNAL MEDICINE CLINIC | Facility: CLINIC | Age: 47
End: 2022-05-03
Payer: COMMERCIAL

## 2022-05-03 VITALS
HEART RATE: 81 BPM | SYSTOLIC BLOOD PRESSURE: 112 MMHG | TEMPERATURE: 99 F | HEIGHT: 68 IN | OXYGEN SATURATION: 98 % | DIASTOLIC BLOOD PRESSURE: 80 MMHG | BODY MASS INDEX: 22.37 KG/M2 | RESPIRATION RATE: 14 BRPM | WEIGHT: 147.63 LBS

## 2022-05-03 DIAGNOSIS — H61.21 IMPACTED CERUMEN OF RIGHT EAR: ICD-10-CM

## 2022-05-03 DIAGNOSIS — E03.9 ACQUIRED HYPOTHYROIDISM: ICD-10-CM

## 2022-05-03 DIAGNOSIS — Z13.220 SCREENING FOR CHOLESTEROL LEVEL: ICD-10-CM

## 2022-05-03 DIAGNOSIS — Z12.31 BREAST CANCER SCREENING BY MAMMOGRAM: ICD-10-CM

## 2022-05-03 DIAGNOSIS — Z00.00 ENCOUNTER FOR ANNUAL PHYSICAL EXAM: Primary | ICD-10-CM

## 2022-05-03 DIAGNOSIS — I87.2 VENOUS INSUFFICIENCY: ICD-10-CM

## 2022-05-03 PROCEDURE — 99396 PREV VISIT EST AGE 40-64: CPT | Performed by: NURSE PRACTITIONER

## 2022-05-03 PROCEDURE — 99214 OFFICE O/P EST MOD 30 MIN: CPT | Performed by: NURSE PRACTITIONER

## 2022-05-03 PROCEDURE — 3079F DIAST BP 80-89 MM HG: CPT | Performed by: NURSE PRACTITIONER

## 2022-05-03 PROCEDURE — 3074F SYST BP LT 130 MM HG: CPT | Performed by: NURSE PRACTITIONER

## 2022-05-03 PROCEDURE — 3008F BODY MASS INDEX DOCD: CPT | Performed by: NURSE PRACTITIONER

## 2022-05-03 NOTE — PATIENT INSTRUCTIONS
Get your labs done. You should be fasting for at least 10 hours. If you take a multivitamin with Biotin or any biotin product it should be held for 3 days prior to getting your labs done.     See gyne    Get your mammogram    See cardiology for your varicose veins    Check coverage for cologuard    Follow up in 1 year or sooner as needed

## 2022-06-02 DIAGNOSIS — E03.9 ACQUIRED HYPOTHYROIDISM: ICD-10-CM

## 2022-06-02 RX ORDER — LEVOTHYROXINE SODIUM 0.03 MG/1
TABLET ORAL
Qty: 30 TABLET | Refills: 0 | Status: SHIPPED | OUTPATIENT
Start: 2022-06-02

## 2022-06-02 NOTE — TELEPHONE ENCOUNTER
Last OV relevant to medication: 5/3/22   Last refill date: 5/2/22 30    #/refills: 0  When pt was asked to return for OV: prn   Upcoming appt/reason:   Future Appointments   Date Time Provider Lilian Blanca   8/29/2022 11:00 AM Jama Proctor, APRN Fran Blizzard       Was pt informed of any over due labs: due.  Pt notified through [facility]     TSH   Date Value   04/12/2021 3.280 mIU/mL   09/12/2013 2.04 mIU/L     TSH W/REFLEX TO FT4 (mIU/L)   Date Value   02/04/2016 2.46

## 2022-07-03 DIAGNOSIS — E03.9 ACQUIRED HYPOTHYROIDISM: ICD-10-CM

## 2022-07-05 RX ORDER — LEVOTHYROXINE SODIUM 0.03 MG/1
TABLET ORAL
Qty: 30 TABLET | Refills: 0 | Status: SHIPPED | OUTPATIENT
Start: 2022-07-05

## 2022-07-05 NOTE — TELEPHONE ENCOUNTER
Last OV relevant to medication: 5/3/22  Last refill date: 6.2/22 30    #/refills: 0  When pt was asked to return for OV: 1 year   Upcoming appt/reason:   Future Appointments   Date Time Provider Lilian Blanca   8/29/2022 11:00 AM FRANCISCA Berrios Chi       Was pt informed of any over due labs: due.  Pt notified through [facility]     TSH   Date Value   04/12/2021 3.280 mIU/mL   09/12/2013 2.04 mIU/L     TSH W/REFLEX TO FT4 (mIU/L)   Date Value   02/04/2016 2.46

## 2022-08-04 DIAGNOSIS — E03.9 ACQUIRED HYPOTHYROIDISM: ICD-10-CM

## 2022-08-08 RX ORDER — LEVOTHYROXINE SODIUM 0.03 MG/1
TABLET ORAL
Qty: 30 TABLET | Refills: 0 | Status: SHIPPED | OUTPATIENT
Start: 2022-08-08

## 2022-08-08 NOTE — TELEPHONE ENCOUNTER
Pended 30/0  Last OV relevant to medication: 5/3/22  Last refill date: 7/5/22    #/refills: 30/0  When pt was asked to return for OV: Follow up in one year or sooner as needed  Upcoming appt/reason: none   Was pt informed of any over due labs: cmp, flp, cbc, thyroid panel left message to complete with fasting instructions   TSH   Date Value   04/12/2021 3.280 mIU/mL   09/12/2013 2.04 mIU/L     TSH W/REFLEX TO FT4 (mIU/L)   Date Value   02/04/2016 2.46

## 2022-08-08 NOTE — TELEPHONE ENCOUNTER
Pt is calling completely out of her medication. Pt needs to get her daughter off to college next week. Once she gets her daughter off to school then she promises she's going to work on herself and get her labwork done. She will have time to work on herself once she gets her kids where they need to be. Please advise.

## 2022-09-03 LAB
ABSOLUTE BASOPHILS: 49 CELLS/UL (ref 0–200)
ABSOLUTE EOSINOPHILS: 60 CELLS/UL (ref 15–500)
ABSOLUTE LYMPHOCYTES: 1740 CELLS/UL (ref 850–3900)
ABSOLUTE MONOCYTES: 417 CELLS/UL (ref 200–950)
ABSOLUTE NEUTROPHILS: 1236 CELLS/UL (ref 1500–7800)
ALBUMIN/GLOBULIN RATIO: 1.8 (CALC) (ref 1–2.5)
ALBUMIN: 4.4 G/DL (ref 3.6–5.1)
ALKALINE PHOSPHATASE: 34 U/L (ref 31–125)
ALT: 14 U/L (ref 6–29)
AST: 21 U/L (ref 10–35)
BASOPHILS: 1.4 %
BILIRUBIN, TOTAL: 0.5 MG/DL (ref 0.2–1.2)
BUN: 15 MG/DL (ref 7–25)
CALCIUM: 9.5 MG/DL (ref 8.6–10.2)
CARBON DIOXIDE: 28 MMOL/L (ref 20–32)
CHLORIDE: 105 MMOL/L (ref 98–110)
CHOL/HDLC RATIO: 2 (CALC)
CHOLESTEROL, TOTAL: 219 MG/DL
CREATININE: 0.97 MG/DL (ref 0.5–0.99)
EGFR: 72 ML/MIN/1.73M2
EOSINOPHILS: 1.7 %
GLOBULIN: 2.4 G/DL (CALC) (ref 1.9–3.7)
GLUCOSE: 93 MG/DL (ref 65–99)
HDL CHOLESTEROL: 107 MG/DL
HEMATOCRIT: 42.3 % (ref 35–45)
HEMOGLOBIN: 13.7 G/DL (ref 11.7–15.5)
LDL-CHOLESTEROL: 97 MG/DL (CALC)
LYMPHOCYTES: 49.7 %
MCH: 31.9 PG (ref 27–33)
MCHC: 32.4 G/DL (ref 32–36)
MCV: 98.6 FL (ref 80–100)
MONOCYTES: 11.9 %
MPV: 10.5 FL (ref 7.5–12.5)
NEUTROPHILS: 35.3 %
NON-HDL CHOLESTEROL: 112 MG/DL (CALC)
PLATELET COUNT: 284 THOUSAND/UL (ref 140–400)
POTASSIUM: 4.9 MMOL/L (ref 3.5–5.3)
PROTEIN, TOTAL: 6.8 G/DL (ref 6.1–8.1)
RDW: 11.8 % (ref 11–15)
RED BLOOD CELL COUNT: 4.29 MILLION/UL (ref 3.8–5.1)
SODIUM: 141 MMOL/L (ref 135–146)
TRIGLYCERIDES: 63 MG/DL
TSH: 3.72 MIU/L
WHITE BLOOD CELL COUNT: 3.5 THOUSAND/UL (ref 3.8–10.8)

## 2022-09-06 DIAGNOSIS — R79.89 ABNORMAL CBC: Primary | ICD-10-CM

## 2022-09-06 DIAGNOSIS — E03.9 ACQUIRED HYPOTHYROIDISM: ICD-10-CM

## 2022-09-06 RX ORDER — LEVOTHYROXINE SODIUM 0.03 MG/1
TABLET ORAL
Qty: 90 TABLET | Refills: 1 | Status: SHIPPED | OUTPATIENT
Start: 2022-09-06

## 2022-11-16 ENCOUNTER — HOSPITAL ENCOUNTER (OUTPATIENT)
Age: 47
Discharge: HOME | End: 2022-11-16
Payer: COMMERCIAL

## 2022-11-16 VITALS
HEART RATE: 76 BPM | BODY MASS INDEX: 21.77 KG/M2 | DIASTOLIC BLOOD PRESSURE: 82 MMHG | WEIGHT: 147 LBS | SYSTOLIC BLOOD PRESSURE: 132 MMHG | TEMPERATURE: 97 F | OXYGEN SATURATION: 96 % | HEIGHT: 69 IN | RESPIRATION RATE: 18 BRPM

## 2022-11-16 DIAGNOSIS — J01.00 ACUTE NON-RECURRENT MAXILLARY SINUSITIS: Primary | ICD-10-CM

## 2022-11-16 PROCEDURE — 99213 OFFICE O/P EST LOW 20 MIN: CPT | Performed by: PHYSICIAN ASSISTANT

## 2022-11-16 RX ORDER — AMOXICILLIN AND CLAVULANATE POTASSIUM 875; 125 MG/1; MG/1
1 TABLET, FILM COATED ORAL 2 TIMES DAILY
Qty: 20 TABLET | Refills: 0 | Status: SHIPPED | OUTPATIENT
Start: 2022-11-16 | End: 2022-11-26

## 2022-11-16 RX ORDER — PREDNISONE 20 MG/1
40 TABLET ORAL DAILY
Qty: 10 TABLET | Refills: 0 | Status: SHIPPED | OUTPATIENT
Start: 2022-11-16 | End: 2022-11-21

## 2022-11-16 NOTE — ED INITIAL ASSESSMENT (HPI)
+ Home Covid last week,  Now having ear pain pressure & sinus pressure. Minimal relief w sudafed yesterday. Denies fever.

## 2022-11-16 NOTE — DISCHARGE INSTRUCTIONS
Continue Sudafed and Flonase. Recommend antihistamine such as Zyrtec. Start with steroid course and monitor for improvement. Only start antibiotic for failure to improve.

## 2022-12-03 ENCOUNTER — TELEPHONE (OUTPATIENT)
Dept: INTERNAL MEDICINE CLINIC | Facility: CLINIC | Age: 47
End: 2022-12-03

## 2023-01-30 ENCOUNTER — HOSPITAL ENCOUNTER (OUTPATIENT)
Dept: MAMMOGRAPHY | Facility: HOSPITAL | Age: 48
Discharge: HOME | End: 2023-01-30
Attending: NURSE PRACTITIONER
Payer: COMMERCIAL

## 2023-01-30 DIAGNOSIS — Z12.31 BREAST CANCER SCREENING BY MAMMOGRAM: ICD-10-CM

## 2023-01-30 PROCEDURE — 77063 BREAST TOMOSYNTHESIS BI: CPT | Performed by: NURSE PRACTITIONER

## 2023-01-30 PROCEDURE — 77067 SCR MAMMO BI INCL CAD: CPT | Performed by: NURSE PRACTITIONER

## 2023-02-28 DIAGNOSIS — E03.9 ACQUIRED HYPOTHYROIDISM: ICD-10-CM

## 2023-02-28 RX ORDER — LEVOTHYROXINE SODIUM 0.03 MG/1
TABLET ORAL
Qty: 90 TABLET | Refills: 0 | Status: SHIPPED | OUTPATIENT
Start: 2023-02-28

## 2023-05-31 DIAGNOSIS — E03.9 ACQUIRED HYPOTHYROIDISM: ICD-10-CM

## 2023-05-31 RX ORDER — LEVOTHYROXINE SODIUM 0.03 MG/1
TABLET ORAL
Qty: 90 TABLET | Refills: 0 | OUTPATIENT
Start: 2023-05-31

## 2023-06-02 RX ORDER — LEVOTHYROXINE SODIUM 0.03 MG/1
25 TABLET ORAL DAILY
Qty: 90 TABLET | Refills: 0 | Status: SHIPPED | OUTPATIENT
Start: 2023-06-02

## 2023-06-02 NOTE — TELEPHONE ENCOUNTER
Patient sees Lina Jain in our office. PCP was not changed to Dr Ervin Valencia. Please refill her medication - she has one day left. She scheduled a physical with Kimberly on 6/14/23.

## 2023-06-14 ENCOUNTER — OFFICE VISIT (OUTPATIENT)
Dept: INTERNAL MEDICINE CLINIC | Facility: CLINIC | Age: 48
End: 2023-06-14
Payer: COMMERCIAL

## 2023-06-14 VITALS
DIASTOLIC BLOOD PRESSURE: 70 MMHG | OXYGEN SATURATION: 98 % | TEMPERATURE: 98 F | SYSTOLIC BLOOD PRESSURE: 124 MMHG | HEIGHT: 69 IN | BODY MASS INDEX: 21.48 KG/M2 | WEIGHT: 145 LBS | RESPIRATION RATE: 20 BRPM | HEART RATE: 80 BPM

## 2023-06-14 DIAGNOSIS — Z12.31 ENCOUNTER FOR SCREENING MAMMOGRAM FOR MALIGNANT NEOPLASM OF BREAST: ICD-10-CM

## 2023-06-14 DIAGNOSIS — Z13.29 SCREENING FOR THYROID DISORDER: ICD-10-CM

## 2023-06-14 DIAGNOSIS — Z12.11 ENCOUNTER FOR SCREENING FECAL OCCULT BLOOD TESTING: ICD-10-CM

## 2023-06-14 DIAGNOSIS — Z00.00 ENCOUNTER FOR ANNUAL PHYSICAL EXAM: Primary | ICD-10-CM

## 2023-06-14 DIAGNOSIS — Z13.220 SCREENING FOR CHOLESTEROL LEVEL: ICD-10-CM

## 2023-06-14 PROCEDURE — 3078F DIAST BP <80 MM HG: CPT | Performed by: NURSE PRACTITIONER

## 2023-06-14 PROCEDURE — 99396 PREV VISIT EST AGE 40-64: CPT | Performed by: NURSE PRACTITIONER

## 2023-06-14 PROCEDURE — 3008F BODY MASS INDEX DOCD: CPT | Performed by: NURSE PRACTITIONER

## 2023-06-14 PROCEDURE — 3074F SYST BP LT 130 MM HG: CPT | Performed by: NURSE PRACTITIONER

## 2023-06-14 NOTE — PATIENT INSTRUCTIONS
Check with your insurance to verify coverage for the Shingrix vaccine for shingles. Also check to see where you can go to get the vaccine. Get your lab done    Continue to see psychiatry     Send in the stool sample for colon screening    Get your mammogram when due    Get your labs done in September. You should be fasting for at least 10 hours. If you take a multivitamin with Biotin or any biotin product it should be held for 3 days prior to getting your labs done.      Follow up in 1 year or sooner as needed

## 2023-06-16 LAB
ABSOLUTE BASOPHILS: 81 CELLS/UL (ref 0–200)
ABSOLUTE EOSINOPHILS: 32 CELLS/UL (ref 15–500)
ABSOLUTE LYMPHOCYTES: 2142 CELLS/UL (ref 850–3900)
ABSOLUTE MONOCYTES: 585 CELLS/UL (ref 200–950)
ABSOLUTE NEUTROPHILS: 1661 CELLS/UL (ref 1500–7800)
ALBUMIN/GLOBULIN RATIO: 1.7 (CALC) (ref 1–2.5)
ALBUMIN: 4.4 G/DL (ref 3.6–5.1)
ALKALINE PHOSPHATASE: 40 U/L (ref 31–125)
ALT: 15 U/L (ref 6–29)
AST: 21 U/L (ref 10–35)
BASOPHILS: 1.8 %
BILIRUBIN, TOTAL: 0.5 MG/DL (ref 0.2–1.2)
BUN: 15 MG/DL (ref 7–25)
CALCIUM: 10 MG/DL (ref 8.6–10.2)
CARBON DIOXIDE: 30 MMOL/L (ref 20–32)
CHLORIDE: 102 MMOL/L (ref 98–110)
CHOL/HDLC RATIO: 2 (CALC)
CHOLESTEROL, TOTAL: 206 MG/DL
CREATININE: 0.86 MG/DL (ref 0.5–0.99)
EGFR: 83 ML/MIN/1.73M2
EOSINOPHILS: 0.7 %
GLOBULIN: 2.6 G/DL (CALC) (ref 1.9–3.7)
GLUCOSE: 86 MG/DL (ref 65–139)
HDL CHOLESTEROL: 103 MG/DL
HEMATOCRIT: 38.7 % (ref 35–45)
HEMOGLOBIN: 13 G/DL (ref 11.7–15.5)
LDL-CHOLESTEROL: 86 MG/DL (CALC)
LYMPHOCYTES: 47.6 %
MCH: 32 PG (ref 27–33)
MCHC: 33.6 G/DL (ref 32–36)
MCV: 95.3 FL (ref 80–100)
MONOCYTES: 13 %
MPV: 10.3 FL (ref 7.5–12.5)
NEUTROPHILS: 36.9 %
NON-HDL CHOLESTEROL: 103 MG/DL (CALC)
PLATELET COUNT: 289 THOUSAND/UL (ref 140–400)
POTASSIUM: 4.9 MMOL/L (ref 3.5–5.3)
PROTEIN, TOTAL: 7 G/DL (ref 6.1–8.1)
RDW: 12 % (ref 11–15)
RED BLOOD CELL COUNT: 4.06 MILLION/UL (ref 3.8–5.1)
SODIUM: 139 MMOL/L (ref 135–146)
TRIGLYCERIDES: 78 MG/DL
TSH W/REFLEX TO FT4: 3.21 MIU/L
WHITE BLOOD CELL COUNT: 4.5 THOUSAND/UL (ref 3.8–10.8)

## 2023-08-29 DIAGNOSIS — E03.9 ACQUIRED HYPOTHYROIDISM: ICD-10-CM

## 2023-08-29 RX ORDER — LEVOTHYROXINE SODIUM 0.03 MG/1
25 TABLET ORAL DAILY
Qty: 90 TABLET | Refills: 2 | Status: SHIPPED | OUTPATIENT
Start: 2023-08-29

## 2023-10-06 ENCOUNTER — HOSPITAL ENCOUNTER (OUTPATIENT)
Age: 48
Discharge: HOME | End: 2023-10-06
Payer: COMMERCIAL

## 2023-10-06 VITALS
DIASTOLIC BLOOD PRESSURE: 90 MMHG | TEMPERATURE: 98 F | SYSTOLIC BLOOD PRESSURE: 126 MMHG | BODY MASS INDEX: 22 KG/M2 | RESPIRATION RATE: 18 BRPM | OXYGEN SATURATION: 99 % | WEIGHT: 150 LBS | HEART RATE: 68 BPM

## 2023-10-06 DIAGNOSIS — R19.5 LOOSE STOOLS: Primary | ICD-10-CM

## 2023-10-06 LAB
BILIRUB UR QL STRIP: NEGATIVE
CLARITY UR: CLEAR
COLOR UR: YELLOW
GLUCOSE UR STRIP-MCNC: NEGATIVE MG/DL
HGB UR QL STRIP: NEGATIVE
KETONES UR STRIP-MCNC: NEGATIVE MG/DL
LEUKOCYTE ESTERASE UR QL STRIP: NEGATIVE
NITRITE UR QL STRIP: NEGATIVE
PH UR STRIP: 7 [PH]
PROT UR STRIP-MCNC: NEGATIVE MG/DL
SARS-COV-2 RNA RESP QL NAA+PROBE: NOT DETECTED
SP GR UR STRIP: 1.01
UROBILINOGEN UR STRIP-ACNC: <2 MG/DL

## 2023-10-06 PROCEDURE — 99213 OFFICE O/P EST LOW 20 MIN: CPT | Performed by: PHYSICIAN ASSISTANT

## 2023-10-06 PROCEDURE — U0002 COVID-19 LAB TEST NON-CDC: HCPCS | Performed by: PHYSICIAN ASSISTANT

## 2023-10-06 PROCEDURE — 81002 URINALYSIS NONAUTO W/O SCOPE: CPT | Performed by: PHYSICIAN ASSISTANT

## 2023-10-06 NOTE — ED INITIAL ASSESSMENT (HPI)
Diarrhea on & off since Monday after [place] salad at the airport. Seems to calm down but reoccurs after eating anything. Presently slightly nauseated & bloated. Not belching or passing. Soft stool when woke up. Last loose stool 45 minutes ago after eating. Denies fever or chills.

## 2023-10-09 ENCOUNTER — LAB ENCOUNTER (OUTPATIENT)
Dept: LAB | Facility: HOSPITAL | Age: 48
End: 2023-10-09
Attending: PHYSICIAN ASSISTANT
Payer: COMMERCIAL

## 2023-10-09 DIAGNOSIS — Z12.11 ENCOUNTER FOR SCREENING FECAL OCCULT BLOOD TESTING: ICD-10-CM

## 2023-10-09 DIAGNOSIS — R19.5 LOOSE STOOLS: ICD-10-CM

## 2023-10-09 PROCEDURE — 87046 STOOL CULTR AEROBIC BACT EA: CPT

## 2023-10-09 PROCEDURE — 87427 SHIGA-LIKE TOXIN AG IA: CPT

## 2023-10-09 PROCEDURE — 87045 FECES CULTURE AEROBIC BACT: CPT

## 2023-10-09 PROCEDURE — 82272 OCCULT BLD FECES 1-3 TESTS: CPT

## 2024-02-12 ENCOUNTER — HOSPITAL ENCOUNTER (OUTPATIENT)
Age: 49
Discharge: HOME | End: 2024-02-12
Payer: COMMERCIAL

## 2024-02-12 VITALS
DIASTOLIC BLOOD PRESSURE: 93 MMHG | SYSTOLIC BLOOD PRESSURE: 148 MMHG | OXYGEN SATURATION: 97 % | TEMPERATURE: 99 F | RESPIRATION RATE: 24 BRPM | HEART RATE: 82 BPM

## 2024-02-12 DIAGNOSIS — U07.1 COVID-19: Primary | ICD-10-CM

## 2024-02-12 PROCEDURE — 99213 OFFICE O/P EST LOW 20 MIN: CPT | Performed by: NURSE PRACTITIONER

## 2024-02-12 RX ORDER — CODEINE PHOSPHATE AND GUAIFENESIN 10; 100 MG/5ML; MG/5ML
5 SOLUTION ORAL EVERY 6 HOURS PRN
Qty: 100 ML | Refills: 0 | Status: SHIPPED | OUTPATIENT
Start: 2024-02-12

## 2024-02-12 RX ORDER — BENZONATATE 100 MG/1
100 CAPSULE ORAL 3 TIMES DAILY PRN
Qty: 12 CAPSULE | Refills: 0 | Status: SHIPPED | OUTPATIENT
Start: 2024-02-12

## 2024-02-12 NOTE — DISCHARGE INSTRUCTIONS
You may continue Delsym, Sudafed, ibuprofen.  Start Flonase.  Use the cough medicines as needed.  Do not mix the codeine cough syrup with Xanax or Ambien.  Stay hydrated.  Schedule follow-up with your primary doctor

## 2024-02-12 NOTE — ED PROVIDER NOTES
Patient Seen in: [facility]      History     Chief Complaint   Patient presents with    Cough/URI    Headache    Sinus Problem     Stated Complaint: covid + past 7 days /chest congestion /cough    Subjective:   48-year-old female with hypothyroidism, anxiety, depression, migraines presents from home with COVID.  Onset of symptoms about 7 days ago.  Had a negative COVID test at home on day 2 of symptoms.  Tested positive at home on day 4 of symptoms.  Complaining of sinus congestion, headache, cough, yellow drainage from her nose.  Initially had a sore throat but that has subsided.  No chest pain or shortness of breath.  No hemoptysis.  She did not take Paxlovid.  She has been taking Delsym, Sudafed, ibuprofen at home.  She has been drinking a lot of water and use hot tea with honey and lemon.  She is vaccinated for COVID but no booster.  Non-smoker.    The history is provided by the patient. No  was used.       Objective:   Past Medical History:   Diagnosis Date    Chronic sinusitis 6/8/2012 4/17/2012: recurrent    Generalized anxiety disorder 10/12/2012    Hip pain 5/11/2012    B/L, tendinitis; 7/9/2012: L hip pain-abductor strain to the iliac crest insertion    Hypothyroidism     Insomnia     Major depressive disorder, recurrent episode, severe, without mention of psychotic behavior 10/12/2012    Migraine headache 7/20/2011    Obsessive-compulsive personality disorder (HCC)     Onychomycosis 5/26/2011    chronic-8/13/2009    Postpartum depression     Seasonal allergies     Shoulder injury 1/4/2008    right    Superficial phlebitis 6/17/2008    thrombosed, lower extremities    Unspecified visual disturbance 2/4/2011    ocular migraine    Varicose veins 5/22/2008              Past Surgical History:   Procedure Laterality Date    BREAST SURGERY  2016    revision of bilat breast augmentation    BREAST SURGERY PROCEDURE UNLISTED  1999    breast augmentation    BREAST  SURGERY PROCEDURE UNLISTED  2013    breast augmentation    IMPLANT LEFT  02/2016    IMPLANT RIGHT  02/2016                Social History     Socioeconomic History    Marital status:    Tobacco Use    Smoking status: Never    Smokeless tobacco: Never   Vaping Use    Vaping Use: Never used   Substance and Sexual Activity    Alcohol use: Yes     Alcohol/week: 0.0 standard drinks of alcohol     Comment: 2-3 glasses wine weekly    Drug use: No    Sexual activity: Yes     Partners: Male   Other Topics Concern    Caffeine Concern Yes     Comment: 3 cups of coffee daily    Stress Concern Yes    Weight Concern No    Special Diet Yes     Comment: No red meat    Exercise Yes     Comment: 5 days per week. running, biking, walking. and weights    Seat Belt Yes              Review of Systems    Positive for stated complaint: covid + past 7 days /chest congestion /cough  Other systems are as noted in HPI.  Constitutional and vital signs reviewed.      All other systems reviewed and negative except as noted above.    Physical Exam     ED Triage Vitals [02/12/24 0940]   BP (!) 148/93   Pulse 82   Resp 24   Temp 98.8 °F (37.1 °C)   Temp src Temporal   SpO2 97 %   O2 Device None (Room air)       Current:BP (!) 148/93   Pulse 82   Temp 98.8 °F (37.1 °C) (Temporal)   Resp 24   LMP 02/01/2024 (Approximate)   SpO2 97%         Physical Exam  Vitals and nursing note reviewed.   Constitutional:       General: She is not in acute distress.     Appearance: Normal appearance. She is not ill-appearing or toxic-appearing.   HENT:      Head: Normocephalic and atraumatic.      Right Ear: Tympanic membrane, ear canal and external ear normal.      Left Ear: Tympanic membrane, ear canal and external ear normal.      Nose: Rhinorrhea present. Rhinorrhea is clear.      Right Turbinates: Not enlarged or swollen.      Left Turbinates: Not enlarged or swollen.      Right Sinus: Maxillary sinus tenderness present.      Left Sinus: Maxillary  sinus tenderness present.      Mouth/Throat:      Mouth: Mucous membranes are moist.      Pharynx: Oropharynx is clear. No pharyngeal swelling or posterior oropharyngeal erythema.      Tonsils: No tonsillar exudate.   Eyes:      Pupils: Pupils are equal, round, and reactive to light.   Cardiovascular:      Rate and Rhythm: Normal rate and regular rhythm.      Pulses: Normal pulses.   Pulmonary:      Effort: Pulmonary effort is normal. No respiratory distress.      Breath sounds: Normal breath sounds.      Comments: No persistent cough on exam.  Lungs clear.  No adventitious lung sounds.  No distress.  No hypoxia.  Pulse ox 97% ra. Which is normal    Abdominal:      General: Abdomen is flat.      Palpations: Abdomen is soft.   Musculoskeletal:         General: No signs of injury. Normal range of motion.      Cervical back: Normal range of motion and neck supple.   Lymphadenopathy:      Cervical: No cervical adenopathy.   Skin:     General: Skin is warm and dry.      Capillary Refill: Capillary refill takes less than 2 seconds.   Neurological:      General: No focal deficit present.      Mental Status: She is alert and oriented to person, place, and time.      GCS: GCS eye subscore is 4. GCS verbal subscore is 5. GCS motor subscore is 6.   Psychiatric:         Mood and Affect: Mood normal.         Behavior: Behavior normal.         Thought Content: Thought content normal.         Judgment: Judgment normal.           ED Course   Labs Reviewed - No data to display    MDM        Medical Decision Making  COVID 19  Differential diagnosis: COVID, pneumonia, sinus infection  Known COVID-positive.  Positive test at home.  No indication for repeat testing here.  Voicing concern about sinus infection.  History of recurrent sinus infections in the past.  She does have clear rhinorrhea.  Some facial tenderness.  No turbinate swelling.  No facial swelling.  Lungs clear.  No distress.  No hypoxia.  Pulse ox 97% room air which is  normal.  No suspicion for pneumonia.  Patient nontoxic-appearing on exam.  Discussed viral origin of COVID and associated symptoms.  No indication for antibiotics at this time.  Recommend supplementing with Flonase and cough medicine.  Tessalon and Cheratussin sent to pharmacy.  Patient has plans to travel in 2 days.  She is outside the window for strict quarantine but recommend that she wears a mask.    Reassurance provided  Results and plan of care discussed with the patient/family. They are in agreement with discharge. They understand to follow up with their primary doctor or the referral physician for further evaluation, especially if no improvement.  Also discussed the limitations of immediate care, patient is aware that if symptoms are worse they should go to the emergency room. Verbal and written discharge instructions were given.           Problems Addressed:  COVID-19: acute illness or injury    Risk  OTC drugs.  Prescription drug management.        Disposition and Plan     Clinical Impression:  1. COVID-19         Disposition:  Discharge  2/12/2024 10:04 am    Follow-up:  Litzy Miramontes MD  [address] [address] [phone]                Medications Prescribed:  Current Discharge Medication List        START taking these medications    Details   benzonatate 100 MG Oral Cap Take 1 capsule (100 mg total) by mouth 3 (three) times daily as needed for cough.  Qty: 12 capsule, Refills: 0    Associated Diagnoses: COVID-19      guaiFENesin-codeine (CHERATUSSIN AC) 100-10 MG/5ML Oral Solution Take 5 mL by mouth every 6 (six) hours as needed for cough.  Qty: 100 mL, Refills: 0    Associated Diagnoses: COVID-19

## 2024-02-13 ENCOUNTER — TELEMEDICINE (OUTPATIENT)
Dept: TELEHEALTH | Age: 49
End: 2024-02-13
Payer: COMMERCIAL

## 2024-02-13 DIAGNOSIS — J01.90 ACUTE RHINOSINUSITIS: Primary | ICD-10-CM

## 2024-02-13 PROCEDURE — 99213 OFFICE O/P EST LOW 20 MIN: CPT | Performed by: PHYSICIAN ASSISTANT

## 2024-02-13 RX ORDER — AMOXICILLIN AND CLAVULANATE POTASSIUM 875; 125 MG/1; MG/1
1 TABLET, FILM COATED ORAL 2 TIMES DAILY
Qty: 20 TABLET | Refills: 0 | Status: SHIPPED | OUTPATIENT
Start: 2024-02-13 | End: 2024-02-23

## 2024-02-13 NOTE — PROGRESS NOTES
Virtual/Telephone Check-In    Sheeba Tabares verbally consents to a Virtual/Telephone Check-In service on 02/13/24.  Patient has been referred to the [facility] website at [email or web address] to review the yearly Consent to Treat document.  Patient understands and accepts financial responsibility for any deductible, co-insurance and/or co-pays associated with this service.       Telehealth Verbal Consent   I conducted a telehealth visit with Sheeba Tabares today, 02/13/24, which was completed using two-way, real-time interactive audio and video communication. This has been done in good raul to provide continuity of care in the best interest of the provider-patient relationship, due to the COVID - public health crisis/national emergency where restrictions of face-to-face office visits are ongoing. Every conscious effort was taken to allow for sufficient and adequate time to complete the visit.  The patient was made aware of the limitations of the telehealth visit, including treatment limitations as no physical exam could be performed.  The patient was advised to call 911 or to go to the ER in case there was an emergency.  The patient was also advised of the potential privacy & security concerns related to the telehealth platform.   The patient was made aware of where to find [facility]'s notice of privacy practices, telehealth consent form and other related consent forms and documents.  which are located on the [facility] website. The patient verbally agreed to telehealth consent form, related consents and the risks discussed.    Lastly, the patient confirmed that they were in [place].   Included in this visit, time may have been spent reviewing labs, medications, radiology tests and decision making. Appropriate medical decision-making and tests are ordered as detailed in the plan of care above.  Coding/billing information is submitted for this visit based on complexity of care and/or time spent for the  visit.    CHIEF COMPLAINT:     Chief Complaint   Patient presents with    Sinus Problem     Seen at  yesterday, sick 7-8 days now, COVID+, concerns about sinuses       HPI:   Sheeba Tabares is a 48 year old female who presents for a video visit.  Patient reports concerns regarding sinuses.  She developed COVID 7 days.  She is concerned she has secondary infection due to cough/sinus discomfort.  Rx'd Cheratussin at bedtime and benzonatate for during the day.    She has been doing Zicam, Advil, Sudafed, Flonase.  Patient admits she is \"just looking for antibiotic.\"   Patient concerned to upcoming airplane travel.   Patient with hx of sinus infections. Patient reports sx are worsening each day despite all OTC remedies.       Current Outpatient Medications   Medication Sig Dispense Refill    benzonatate 100 MG Oral Cap Take 1 capsule (100 mg total) by mouth 3 (three) times daily as needed for cough. 12 capsule 0    guaiFENesin-codeine (CHERATUSSIN AC) 100-10 MG/5ML Oral Solution Take 5 mL by mouth every 6 (six) hours as needed for cough. 100 mL 0    ZOLPIDEM 10 MG Oral Tab TAKE 1 TABLET BY MOUTH NIGHTLY 30 tablet 0    ALPRAZOLAM 0.25 MG Oral Tab TAKE 1 TO 2 TABLETS BY MOUTH EVERY 4 TO 6 HOURS AS NEEDED FOR ANXIETY 120 tablet 0    BUSPIRONE 5 MG Oral Tab Take 1 tablet (5 mg total) by mouth 3 times daily. 90 tablet 0    venlafaxine  MG Oral Capsule SR 24 Hr Take 1 capsule (150 mg total) by mouth daily. 30 capsule 5    levothyroxine 25 MCG Oral Tab Take 1 tablet (25 mcg total) by mouth daily. 90 tablet 2    RIZATRIPTAN BENZOATE 10 MG Oral Tab TAKE 1 TABLET BY MOUTH RIGHT AWAY, MAY REPEAT IN 2 HOURS ONCE (Patient taking differently: Take 1 tablet (10 mg total) by mouth as needed for Migraine.) 9 tablet 0    Cetirizine HCl (ZYRTEC OR) Take 1 tablet by mouth as needed.      Terbinafine HCl 250 MG Oral Tab Take 1 tablet (250 mg total) by mouth daily.      Multiple Vitamins-Minerals (EMERGEN-C FIVE) Oral Powd Pack  Take by mouth.      Probiotic Product (PROBIOTIC OR) Take by mouth.      fluticasone (FLONASE) 50 MCG/ACT Nasal Suspension 2 sprays in each nostril daily 1 Bottle 3      Past Medical History:   Diagnosis Date    Chronic sinusitis 6/8/2012 4/17/2012: recurrent    Generalized anxiety disorder 10/12/2012    Hip pain 5/11/2012    B/L, tendinitis; 7/9/2012: L hip pain-abductor strain to the iliac crest insertion    Hypothyroidism     Insomnia     Major depressive disorder, recurrent episode, severe, without mention of psychotic behavior 10/12/2012    Migraine headache 7/20/2011    Obsessive-compulsive personality disorder (HCC)     Onychomycosis 5/26/2011    chronic-8/13/2009    Postpartum depression     Seasonal allergies     Shoulder injury 1/4/2008    right    Superficial phlebitis 6/17/2008    thrombosed, lower extremities    Unspecified visual disturbance 2/4/2011    ocular migraine    Varicose veins 5/22/2008      Past Surgical History:   Procedure Laterality Date    BREAST SURGERY  2016    revision of bilat breast augmentation    BREAST SURGERY PROCEDURE UNLISTED  1999    breast augmentation    BREAST SURGERY PROCEDURE UNLISTED  2013    breast augmentation    IMPLANT LEFT  02/2016    IMPLANT RIGHT  02/2016         Social History     Socioeconomic History    Marital status:    Tobacco Use    Smoking status: Never    Smokeless tobacco: Never   Vaping Use    Vaping Use: Never used   Substance and Sexual Activity    Alcohol use: Yes     Alcohol/week: 0.0 standard drinks of alcohol     Comment: 2-3 glasses wine weekly    Drug use: No    Sexual activity: Yes     Partners: Male   Other Topics Concern    Caffeine Concern Yes     Comment: 3 cups of coffee daily    Stress Concern Yes    Weight Concern No    Special Diet Yes     Comment: No red meat    Exercise Yes     Comment: 5 days per week. running, biking, walking. and weights    Seat Belt Yes         REVIEW OF SYSTEMS:   GENERAL: appetite is ok  SKIN: no  rashes or abnormal skin lesions  HEENT: See HPI  LUNGS: denies shortness of breath or wheezing, See HPI  CARDIOVASCULAR: denies chest pain or palpitations   GI: denies N/V/C or abdominal pain  NEURO: sinus headaches    EXAM:   General: Alert, Ill-appearing/sounding, and In no acute distress  Respiratory:   Speaking in full sentences comfortably  Normal work of breathing  No cough during visit  Head: Normocephalic  Nose: + nasal congestion, sinus pressure  Skin: No obvious rashes or lesions from what observed.     No results found for this or any previous visit (from the past 24 hour(s)).    ASSESSMENT AND PLAN:   Sheeba Tabares is a 48 year old female who presents with symptoms that are consistent with    ASSESSMENT:   Encounter Diagnosis   Name Primary?    Acute rhinosinusitis Yes       PLAN: Advised likely still viral sinusitis secondary to COVID, but with upcoming trip willing to send over abx.  Continue OTC regimen.  Afrin 30-60 mins prior to take off.  Meds as below.  See patient Instructions    Meds & Refills for this Visit:  Requested Prescriptions     Signed Prescriptions Disp Refills    amoxicillin clavulanate 875-125 MG Oral Tab 20 tablet 0     Sig: Take 1 tablet by mouth 2 (two) times daily for 10 days.       Risks, benefits, and side effects of medication explained and discussed.    The patient indicates understanding of these issues and agrees to the plan.  The patient is asked to return if sx's persist or worsen.    Face to face time spent on Video Visit: 11  Total Time spent on visit including reviewing history, ordering labs/medication, patient examination and education: 18    Sheeba Tabares understands video visit evaluation is not a substitute for face-to-face examination or emergency care. Patient advised to go to ER or call 911 for worsening symptoms or acute distress.

## 2024-02-22 ENCOUNTER — TELEMEDICINE (OUTPATIENT)
Dept: TELEHEALTH | Age: 49
End: 2024-02-22
Payer: COMMERCIAL

## 2024-02-22 DIAGNOSIS — U09.9 POST-COVID SYNDROME: Primary | ICD-10-CM

## 2024-02-22 DIAGNOSIS — B97.89 VIRAL SINUSITIS: ICD-10-CM

## 2024-02-22 DIAGNOSIS — J32.9 VIRAL SINUSITIS: ICD-10-CM

## 2024-02-22 PROCEDURE — 99213 OFFICE O/P EST LOW 20 MIN: CPT | Performed by: PHYSICIAN ASSISTANT

## 2024-02-22 RX ORDER — PREDNISONE 20 MG/1
40 TABLET ORAL DAILY
Qty: 10 TABLET | Refills: 0 | Status: SHIPPED | OUTPATIENT
Start: 2024-02-22 | End: 2024-02-27

## 2024-02-22 NOTE — PROGRESS NOTES
Virtual/Telephone Check-In    Sheeba Tabares verbally consents to a Virtual/Telephone Check-In service on 02/22/24.  Patient has been referred to the [facility] website at [email or web address] to review the yearly Consent to Treat document.  Patient understands and accepts financial responsibility for any deductible, co-insurance and/or co-pays associated with this service.       Telehealth Verbal Consent   I conducted a telehealth visit with Sheeba Tabares today, 02/22/24, which was completed using two-way, real-time interactive audio and video communication. This has been done in good raul to provide continuity of care in the best interest of the provider-patient relationship, due to the COVID - public health crisis/national emergency where restrictions of face-to-face office visits are ongoing. Every conscious effort was taken to allow for sufficient and adequate time to complete the visit.  The patient was made aware of the limitations of the telehealth visit, including treatment limitations as no physical exam could be performed.  The patient was advised to call 911 or to go to the ER in case there was an emergency.  The patient was also advised of the potential privacy & security concerns related to the telehealth platform.   The patient was made aware of where to find [facility]'s notice of privacy practices, telehealth consent form and other related consent forms and documents.  which are located on the [facility] website. The patient verbally agreed to telehealth consent form, related consents and the risks discussed.    Lastly, the patient confirmed that they were in [place].   Included in this visit, time may have been spent reviewing labs, medications, radiology tests and decision making. Appropriate medical decision-making and tests are ordered as detailed in the plan of care above.  Coding/billing information is submitted for this visit based on complexity of care and/or time spent for the  visit.    CHIEF COMPLAINT:     Chief Complaint   Patient presents with    Covid     Follow up to last week, + sinus inflammation. \"I feel like I just need prednisone\"       HPI:   Sheeba Tabares is a 48 year old female who presents for a video visit.  Patient seen by this provider last week.  She had recently been dx with COVID and wanted to be treated for secondary sinus. During our last meeting I informed patient I did not feel she had ABRS but more likely viral sinus, however due to upcoming travel plans we agreed to trial abx.  Patient reports that she is nearly done with Augmentin and is still so inflamed.  Lots of sinus pressure/cough.  She reports, \"I feel like I just need prednisone.\"  She has been doing sinus rinses, herbal nasal inhalation,  Zyrtec, Flonase.  She stopped using Sudafed a few days ago.  She has been on prednisone in the past for sinus issues and reports it helped greatly.      Current Outpatient Medications   Medication Sig Dispense Refill    [START ON 2/27/2024] venlafaxine  MG Oral Capsule SR 24 Hr Take 1 capsule (150 mg total) by mouth daily. 30 capsule 0    amoxicillin clavulanate 875-125 MG Oral Tab Take 1 tablet by mouth 2 (two) times daily for 10 days. 20 tablet 0    benzonatate 100 MG Oral Cap Take 1 capsule (100 mg total) by mouth 3 (three) times daily as needed for cough. 12 capsule 0    guaiFENesin-codeine (CHERATUSSIN AC) 100-10 MG/5ML Oral Solution Take 5 mL by mouth every 6 (six) hours as needed for cough. 100 mL 0    ZOLPIDEM 10 MG Oral Tab TAKE 1 TABLET BY MOUTH NIGHTLY 30 tablet 0    ALPRAZOLAM 0.25 MG Oral Tab TAKE 1 TO 2 TABLETS BY MOUTH EVERY 4 TO 6 HOURS AS NEEDED FOR ANXIETY 120 tablet 0    BUSPIRONE 5 MG Oral Tab Take 1 tablet (5 mg total) by mouth 3 times daily. 90 tablet 0    levothyroxine 25 MCG Oral Tab Take 1 tablet (25 mcg total) by mouth daily. 90 tablet 2    RIZATRIPTAN BENZOATE 10 MG Oral Tab TAKE 1 TABLET BY MOUTH RIGHT AWAY, MAY REPEAT IN 2 HOURS  ONCE (Patient taking differently: Take 1 tablet (10 mg total) by mouth as needed for Migraine.) 9 tablet 0    Cetirizine HCl (ZYRTEC OR) Take 1 tablet by mouth as needed.      Terbinafine HCl 250 MG Oral Tab Take 1 tablet (250 mg total) by mouth daily.      Multiple Vitamins-Minerals (EMERGEN-C FIVE) Oral Powd Pack Take by mouth.      Probiotic Product (PROBIOTIC OR) Take by mouth.      fluticasone (FLONASE) 50 MCG/ACT Nasal Suspension 2 sprays in each nostril daily 1 Bottle 3      Past Medical History:   Diagnosis Date    Chronic sinusitis 6/8/2012 4/17/2012: recurrent    Generalized anxiety disorder 10/12/2012    Hip pain 5/11/2012    B/L, tendinitis; 7/9/2012: L hip pain-abductor strain to the iliac crest insertion    Hypothyroidism     Insomnia     Major depressive disorder, recurrent episode, severe, without mention of psychotic behavior 10/12/2012    Migraine headache 7/20/2011    Obsessive-compulsive personality disorder (HCC)     Onychomycosis 5/26/2011    chronic-8/13/2009    Postpartum depression     Seasonal allergies     Shoulder injury 1/4/2008    right    Superficial phlebitis 6/17/2008    thrombosed, lower extremities    Unspecified visual disturbance 2/4/2011    ocular migraine    Varicose veins 5/22/2008      Past Surgical History:   Procedure Laterality Date    BREAST SURGERY  2016    revision of bilat breast augmentation    BREAST SURGERY PROCEDURE UNLISTED  1999    breast augmentation    BREAST SURGERY PROCEDURE UNLISTED  2013    breast augmentation    IMPLANT LEFT  02/2016    IMPLANT RIGHT  02/2016         Social History     Socioeconomic History    Marital status:    Tobacco Use    Smoking status: Never    Smokeless tobacco: Never   Vaping Use    Vaping Use: Never used   Substance and Sexual Activity    Alcohol use: Yes     Alcohol/week: 0.0 standard drinks of alcohol     Comment: 2-3 glasses wine weekly    Drug use: No    Sexual activity: Yes     Partners: Male   Other Topics Concern     Caffeine Concern Yes     Comment: 3 cups of coffee daily    Stress Concern Yes    Weight Concern No    Special Diet Yes     Comment: No red meat    Exercise Yes     Comment: 5 days per week. running, biking, walking. and weights    Seat Belt Yes         REVIEW OF SYSTEMS:   GENERAL: still feeling unwell/fatigued  HEENT: See HPI  NEURO: + sinus headaches    EXAM:   General: Alert, Well-appearing, and In no acute distress  Respiratory:   Speaking in full sentences comfortably  Normal work of breathing  No cough during visit  Head: Normocephalic  Nose: No obvious nasal discharge.  Skin: No obvious rashes or lesions from what observed.     No results found for this or any previous visit (from the past 24 hour(s)).    ASSESSMENT AND PLAN:   Sheeba Tabares is a 48 year old female who presents with symptoms that are consistent with    ASSESSMENT:   Encounter Diagnoses   Name Primary?    Post-COVID syndrome Yes    Viral sinusitis        PLAN:  Willing to so short course of prednisone.  Advised discontinue Zyrtec, Mucinex for now.  Advise adding on Astepro nasal.  If still not better after this she will need to be seen in person by PCP or ENT.       Meds as below.  See patient Instructions    Meds & Refills for this Visit:  Requested Prescriptions     Pending Prescriptions Disp Refills    predniSONE 20 MG Oral Tab 10 tablet 0     Sig: Take 2 tablets (40 mg total) by mouth daily for 5 days.       Risks, benefits, and side effects of medication explained and discussed.    The patient indicates understanding of these issues and agrees to the plan.  The patient is asked to return if sx's persist or worsen.    Face to face time spent on Video Visit: 8  Total Time spent on visit including reviewing history, ordering labs/medication, patient examination and education: 14    Sheeba Tabares understands video visit evaluation is not a substitute for face-to-face examination or emergency care. Patient advised to go to ER  or call 911 for worsening symptoms or acute distress.

## 2024-06-10 NOTE — ED INITIAL ASSESSMENT (HPI)
Pt c/o being covid positive last week. Pt states she feels worse, pt c/o cough, sinus congestion and headache.  Pt worried about a sinus infection.    Yes

## 2024-06-18 ENCOUNTER — OFFICE VISIT (OUTPATIENT)
Dept: INTERNAL MEDICINE CLINIC | Facility: CLINIC | Age: 49
End: 2024-06-18

## 2024-06-18 VITALS
OXYGEN SATURATION: 98 % | HEART RATE: 71 BPM | BODY MASS INDEX: 22.6 KG/M2 | DIASTOLIC BLOOD PRESSURE: 80 MMHG | SYSTOLIC BLOOD PRESSURE: 118 MMHG | TEMPERATURE: 99 F | WEIGHT: 149.13 LBS | HEIGHT: 68.11 IN | RESPIRATION RATE: 16 BRPM

## 2024-06-18 DIAGNOSIS — Z13.220 SCREENING FOR CHOLESTEROL LEVEL: ICD-10-CM

## 2024-06-18 DIAGNOSIS — F41.9 ANXIETY AND DEPRESSION: ICD-10-CM

## 2024-06-18 DIAGNOSIS — E03.9 ACQUIRED HYPOTHYROIDISM: ICD-10-CM

## 2024-06-18 DIAGNOSIS — Z00.00 ENCOUNTER FOR ANNUAL PHYSICAL EXAM: Primary | ICD-10-CM

## 2024-06-18 DIAGNOSIS — Z12.31 ENCOUNTER FOR SCREENING MAMMOGRAM FOR BREAST CANCER: ICD-10-CM

## 2024-06-18 DIAGNOSIS — Z12.83 SCREENING FOR SKIN CANCER: ICD-10-CM

## 2024-06-18 DIAGNOSIS — F32.A ANXIETY AND DEPRESSION: ICD-10-CM

## 2024-06-18 DIAGNOSIS — Z12.11 SCREENING FOR COLON CANCER: ICD-10-CM

## 2024-06-18 DIAGNOSIS — Z13.29 SCREENING FOR THYROID DISORDER: ICD-10-CM

## 2024-06-18 DIAGNOSIS — M21.959 DEFORMITY OF HIP JOINT, UNSPECIFIED LATERALITY: ICD-10-CM

## 2024-06-18 PROCEDURE — 3074F SYST BP LT 130 MM HG: CPT | Performed by: NURSE PRACTITIONER

## 2024-06-18 PROCEDURE — 99396 PREV VISIT EST AGE 40-64: CPT | Performed by: NURSE PRACTITIONER

## 2024-06-18 PROCEDURE — 3079F DIAST BP 80-89 MM HG: CPT | Performed by: NURSE PRACTITIONER

## 2024-06-18 PROCEDURE — 3008F BODY MASS INDEX DOCD: CPT | Performed by: NURSE PRACTITIONER

## 2024-06-18 PROCEDURE — 99214 OFFICE O/P EST MOD 30 MIN: CPT | Performed by: NURSE PRACTITIONER

## 2024-06-18 RX ORDER — ZOLPIDEM TARTRATE 10 MG/1
10 TABLET ORAL
COMMUNITY
End: 2024-06-18 | Stop reason: ALTCHOICE

## 2024-06-18 RX ORDER — BIOTIN 10 MG
1 TABLET ORAL DAILY
COMMUNITY

## 2024-06-18 RX ORDER — LEVOTHYROXINE SODIUM 0.03 MG/1
25 TABLET ORAL DAILY
Qty: 90 TABLET | Refills: 0 | Status: SHIPPED | OUTPATIENT
Start: 2024-06-18

## 2024-06-18 NOTE — PATIENT INSTRUCTIONS
Get your labs done. You should be fasting for at least 10 hours. If you take a multivitamin with Biotin or any biotin product it should be held for 3 days prior to getting your labs done.     Make an appointment to see gynecology     Make an appointment to get your colonoscopy done     Get your mammogram done    Check with your insurance to verify coverage for the Shingrix vaccine for shingles. Also check to see where you can go to get the vaccine. You can also get a price check at the pharmacy instead.      COVID vaccine recommended    Continue to see psychiatry     Do PT for the hips and monitor    Follow up in 1 year or sooner as needed

## 2024-06-18 NOTE — PROGRESS NOTES
CHIEF COMPLAINT       Annual Physical Exam, other issues    HPI:   Sheeba Tabares is a 48 year old female who presents for a complete physical exam, other issues.     Wt Readings from Last 6 Encounters:   06/18/24 149 lb 1.6 oz (67.6 kg)   10/06/23 150 lb (68 kg)   06/14/23 145 lb (65.8 kg)   11/16/22 147 lb (66.7 kg)   05/03/22 147 lb 9.6 oz (67 kg)   12/23/21 148 lb (67.1 kg)     Body mass index is 22.6 kg/m².       Diet and exercise are good. Vaccines reviewed. Wearing seat belt and no texting and driving. Feels safe at home. Pap due. Sees gyne. Needs to schedule. Mammo over due.  Colon cancer screening -did a stool card and will do C scope later this year. No smoking. Moderation of alcohol. Would like to see derm. Labs to be ordered.    Med check- thyroid medication is going well. No SE.     Noticed one hip is sticking out more than the other. No pain yet but feels weird when she is exercising and she would like to go to PT.     Anxiety and depression- sees psych. Stable on current management.           Cholesterol, Total (mg/dL)   Date Value   04/12/2021 184   06/26/2019 173   03/18/2017 153     CHOLESTEROL, TOTAL (mg/dL)   Date Value   06/15/2023 206 (H)   09/02/2022 219 (H)   02/04/2016 146     HDL Cholesterol (mg/dL)   Date Value   04/12/2021 95 (H)   06/26/2019 85 (H)   03/18/2017 90     HDL CHOLESTEROL (mg/dL)   Date Value   06/15/2023 103   09/02/2022 107   02/04/2016 65     LDL Cholesterol (mg/dL)   Date Value   04/12/2021 78   06/26/2019 78   03/18/2017 56     LDL-CHOLESTEROL (mg/dL (calc))   Date Value   06/15/2023 86   09/02/2022 97   02/04/2016 67     AST (U/L)   Date Value   06/15/2023 21   09/02/2022 21   04/12/2021 25   06/26/2019 27   11/27/2017 27   02/04/2016 25     ALT (U/L)   Date Value   06/15/2023 15   09/02/2022 14   04/12/2021 28   06/26/2019 27   11/27/2017 34   02/04/2016 15        Current Outpatient Medications   Medication Sig Dispense Refill    Lactobacillus (PROBIOTIC  ACIDOPHILUS OR) 2 tablets daily.      Multiple Vitamins-Minerals (MULTIVITAMIN ADULT) Oral Chew Tab 1 tablet daily.      VENLAFAXINE  MG Oral Capsule SR 24 Hr TAKE ONE CAPSULE BY MOUTH ONCE DAILY 30 capsule 0    ZOLPIDEM 10 MG Oral Tab TAKE 1 TABLET BY MOUTH NIGHTLY 30 tablet 0    LEVOTHYROXINE 25 MCG Oral Tab Take 1 tablet (25 mcg total) by mouth daily. 90 tablet 0    busPIRone 5 MG Oral Tab TAKE ONE TABLET BY MOUTH THREE TIMES DAILY 90 tablet 0    ALPRAZolam 0.25 MG Oral Tab TAKE 1 OR 2 TABLETS BY MOUTH EVERY 4 TO 6 HOURS AS NEEDED FOR ANXIETY 120 tablet 0    RIZATRIPTAN BENZOATE 10 MG Oral Tab TAKE 1 TABLET BY MOUTH RIGHT AWAY, MAY REPEAT IN 2 HOURS ONCE (Patient taking differently: Take 1 tablet (10 mg total) by mouth as needed for Migraine.) 9 tablet 0    Cetirizine HCl (ZYRTEC OR) Take 1 tablet by mouth as needed.      Multiple Vitamins-Minerals (EMERGEN-C FIVE) Oral Powd Pack Take by mouth.      fluticasone (FLONASE) 50 MCG/ACT Nasal Suspension 2 sprays in each nostril daily 1 Bottle 3      Allergies   Allergen Reactions    Cat Dander [Dander]     Citalopram      Reaction: increased anxiety    Levaquin [Levofloxacin]      Muscle and tendon aches      Past Medical History:    Chronic sinusitis    4/17/2012: recurrent    Generalized anxiety disorder    Hip pain    B/L, tendinitis; 7/9/2012: L hip pain-abductor strain to the iliac crest insertion    Hypothyroidism    Insomnia    Major depressive disorder, recurrent episode, severe, without mention of psychotic behavior    Migraine headache    Obsessive-compulsive personality disorder (HCC)    Onychomycosis    chronic-8/13/2009    Postpartum depression    Seasonal allergies    Shoulder injury    right    Superficial phlebitis    thrombosed, lower extremities    Unspecified visual disturbance    ocular migraine    Varicose veins      Past Surgical History:   Procedure Laterality Date    Breast surgery  2016    revision of bilat breast augmentation    Breast  surgery procedure unlisted  1999    breast augmentation    Breast surgery procedure unlisted  2013    breast augmentation    Implant left  02/2016    Implant right  02/2016      Family History   Problem Relation Age of Onset    Arthritis Father     Diabetes Father         T2 DM    Hypertension Father     Other (Other) Father         cva, MI    Breast Cancer Mother 57        dx age 57      Social History:   Social History     Socioeconomic History    Marital status:    Tobacco Use    Smoking status: Never    Smokeless tobacco: Never   Vaping Use    Vaping status: Never Used   Substance and Sexual Activity    Alcohol use: Yes     Alcohol/week: 0.0 standard drinks of alcohol     Comment: 2-3 glasses wine weekly    Drug use: No    Sexual activity: Yes     Partners: Male   Other Topics Concern    Caffeine Concern Yes     Comment: 3 cups of coffee daily    Stress Concern Yes    Weight Concern No    Special Diet Yes     Comment: No red meat    Exercise Yes     Comment: 5 days per week. running, biking, walking. and weights    Seat Belt Yes          REVIEW OF SYSTEMS:   GENERAL: feels well otherwise  SKIN: no complaint of any unusual skin lesions  EYES: no complaint of blurred vision or double vision  HEENT: no complaint of nasal congestion, sinus pain or ST  LUNGS: no complaint of shortness of breath with exertion  CARDIOVASCULAR: no complaint of chest pain on exertion  GI: no complaint of pain,denies heartburn  : No complains of dysuria or vaginal discharge  MUSCULOSKELETAL: no complaint of back pain  NEURO: no complaint of headaches  PSYCHE: Sees psychiatry.   HEMATOLOGIC: denies hx of anemia    EXAM:   /80 (BP Location: Left arm, Patient Position: Sitting, Cuff Size: adult)   Pulse 71   Temp 98.5 °F (36.9 °C) (Temporal)   Resp 16   Ht 5' 8.11\" (1.73 m)   Wt 149 lb 1.6 oz (67.6 kg)   LMP 02/01/2024 (Approximate)   SpO2 98%   BMI 22.60 kg/m²   Body mass index is 22.6 kg/m².   GENERAL: well  developed, well nourished,in no apparent distress  SKIN: no rashes, no suspicious lesions  HEENT: atraumatic, normocephalic,ears are clear  EYES:PERRLA, EOMI, conjunctiva are clear  NECK: supple,no adenopathy, no thyromegaly  BREAST: declined  LUNGS: clear to auscultation  CARDIO: RRR without murmur  GI: no tenderness  : declined  MUSCULOSKELETAL: No obvious joint swelling. Full strength and ROM to arms and legs. Has possible malalignment to hips.  EXTREMITIES: no edema  NEURO: Oriented times three,cranial nerves are intact,motor and sensory are grossly intact    LABS:     Lab Results   Component Value Date    WBC 4.5 06/15/2023    RBC 4.06 06/15/2023    HGB 13.0 06/15/2023    HCT 38.7 06/15/2023    MCV 95.3 06/15/2023    MCH 32.0 06/15/2023    MCHC 33.6 06/15/2023    RDW 12.0 06/15/2023     06/15/2023      Lab Results   Component Value Date    GLU 86 06/15/2023    BUN 15 06/15/2023    BUNCREA NOT APPLICABLE 06/15/2023    CREATSERUM 0.86 06/15/2023    ANIONGAP 2 04/12/2021    GFR 70 11/27/2017    GFRNAA 69 04/12/2021    GFRAA 79 04/12/2021    CA 10.0 06/15/2023    OSMOCALC 287 04/12/2021    ALKPHO 40 06/15/2023    AST 21 06/15/2023    ALT 15 06/15/2023    BILT 0.5 06/15/2023    TP 7.0 06/15/2023    ALB 4.4 06/15/2023    GLOBULIN 2.6 06/15/2023    AGRATIO 1.7 06/15/2023     06/15/2023    K 4.9 06/15/2023     06/15/2023    CO2 30 06/15/2023      Lab Results   Component Value Date    CHOLEST 206 (H) 06/15/2023    TRIG 78 06/15/2023     06/15/2023    LDL 86 06/15/2023    VLDL 11 04/12/2021    TCHDLRATIO 2.0 06/15/2023    NONHDLC 103 06/15/2023      Lab Results   Component Value Date    TSH 3.72 09/02/2022    TSHT4 3.21 06/15/2023      Lab Results   Component Value Date     06/26/2019    A1C 5.7 (H) 06/26/2019         ASSESSMENT AND PLAN:   1. Encounter for annual physical exam  - Sheeba Tabares is a 47 year old female who presents for a complete physical exam.  Self breast exam  encouraged. Mammo ordered. Pap UTD- due. Will see gyne. Pt' s weight is Body mass index is 21.41 kg/m². Recommended regular exercise. Labs ordered. Vaccines reviewed. C scope referral given. Derm referral given.  - CBC WITH DIFFERENTIAL WITH PLATELET; Future  - COMP METABOLIC PANEL (14); Future  - CBC WITH DIFFERENTIAL WITH PLATELET  - COMP METABOLIC PANEL (14)    2. Acquired hypothyroidism  -Stable.  Continue current management with levothyroxine.  - levothyroxine 25 MCG Oral Tab; Take 1 tablet (25 mcg total) by mouth daily.  Dispense: 90 tablet; Refill: 0    3. Deformity of hip joint, unspecified laterality  -Patient noticed 1 hip is different than the other and it causes her to feel weird when she is exercising.  No pain at this time.  Possible malalignment.  -Start PT.  - OP REFERRAL TO [facility]    4. Anxiety and depression  - stable. Continue to see psych  - also start therapy  -  NAVIGATOR    5. Encounter for screening mammogram for breast cancer  - [facility] STELLA 2D+3D SCREENING BILAT (CPT=77067/79133); Future    6. Screening for thyroid disorder  - Assay, Thyroid Stim Hormone    7. Screening for cholesterol level  - Lipid Panel    8. Screening for colon cancer  - GASTRO - INTERNAL    9. Screening for skin cancer  - DERM - INTERNAL       Follow up in 1 year or sooner as needed  The patient indicates understanding of these issues and agrees to the plan.

## 2024-06-20 ENCOUNTER — TELEPHONE (OUTPATIENT)
Age: 49
End: 2024-06-20

## 2024-06-20 NOTE — TELEPHONE ENCOUNTER
Sorry we missed you. Please feel free to call us back on our Navigation call back line at [phone].    Warm Regards,    [facility]

## 2024-07-01 ENCOUNTER — TELEPHONE (OUTPATIENT)
Dept: INTERNAL MEDICINE CLINIC | Facility: CLINIC | Age: 49
End: 2024-07-01

## 2024-07-01 ENCOUNTER — TELEPHONE (OUTPATIENT)
Age: 49
End: 2024-07-01

## 2024-07-01 NOTE — TELEPHONE ENCOUNTER
The [facility] Navigation team has attempted to reach you in order to follow up on an order that was placed by Mulu Tapia's office. Please give us a call back at [phone] to discuss care coordination and resources.

## 2024-07-01 NOTE — TELEPHONE ENCOUNTER
Faheem Hardwick,     I left messages with my contact information and have not heard back from the patient. In my last message I also provided the [facility] 24[facility]7 Helpline number just in case [phone]. I am closing the order at this time. Please feel free to re-refer the patient for navigation as needed.     Please let me know if there is anything else I can do.     Thank you,   VENKAT Stewart, DIEUDONNE   [facility] Navigation

## 2024-07-03 DIAGNOSIS — E03.9 ACQUIRED HYPOTHYROIDISM: Primary | ICD-10-CM

## 2024-07-03 LAB
ABSOLUTE BASOPHILS: 60 CELLS/UL (ref 0–200)
ABSOLUTE EOSINOPHILS: 90 CELLS/UL (ref 15–500)
ABSOLUTE LYMPHOCYTES: 2374 CELLS/UL (ref 850–3900)
ABSOLUTE MONOCYTES: 464 CELLS/UL (ref 200–950)
ABSOLUTE NEUTROPHILS: 1312 CELLS/UL (ref 1500–7800)
ALBUMIN/GLOBULIN RATIO: 1.6 (CALC) (ref 1–2.5)
ALBUMIN: 4.2 G/DL (ref 3.6–5.1)
ALKALINE PHOSPHATASE: 38 U/L (ref 37–153)
ALT: 14 U/L (ref 6–29)
AST: 23 U/L (ref 10–35)
BASOPHILS: 1.4 %
BILIRUBIN, TOTAL: 0.3 MG/DL (ref 0.2–1.2)
BUN: 16 MG/DL (ref 7–25)
CALCIUM: 9.1 MG/DL (ref 8.6–10.4)
CARBON DIOXIDE: 30 MMOL/L (ref 20–32)
CHLORIDE: 104 MMOL/L (ref 98–110)
CHOL/HDLC RATIO: 2 (CALC)
CHOLESTEROL, TOTAL: 195 MG/DL
CREATININE: 0.95 MG/DL (ref 0.5–1.03)
EGFR: 73 ML/MIN/1.73M2
EOSINOPHILS: 2.1 %
GLOBULIN: 2.6 G/DL (CALC) (ref 1.9–3.7)
GLUCOSE: 98 MG/DL (ref 65–99)
HDL CHOLESTEROL: 96 MG/DL
HEMATOCRIT: 40.1 % (ref 35–45)
HEMOGLOBIN: 13.3 G/DL (ref 11.7–15.5)
LDL-CHOLESTEROL: 85 MG/DL (CALC)
LYMPHOCYTES: 55.2 %
MCH: 32.9 PG (ref 27–33)
MCHC: 33.2 G/DL (ref 32–36)
MCV: 99.3 FL (ref 80–100)
MONOCYTES: 10.8 %
MPV: 10.3 FL (ref 7.5–12.5)
NEUTROPHILS: 30.5 %
NON-HDL CHOLESTEROL: 99 MG/DL (CALC)
PLATELET COUNT: 283 THOUSAND/UL (ref 140–400)
POTASSIUM: 5.1 MMOL/L (ref 3.5–5.3)
PROTEIN, TOTAL: 6.8 G/DL (ref 6.1–8.1)
RDW: 11.7 % (ref 11–15)
RED BLOOD CELL COUNT: 4.04 MILLION/UL (ref 3.8–5.1)
SODIUM: 142 MMOL/L (ref 135–146)
TRIGLYCERIDES: 56 MG/DL
TSH: 4.55 MIU/L
WHITE BLOOD CELL COUNT: 4.3 THOUSAND/UL (ref 3.8–10.8)

## 2024-07-17 ENCOUNTER — TELEPHONE (OUTPATIENT)
Dept: INTERNAL MEDICINE CLINIC | Facility: CLINIC | Age: 49
End: 2024-07-17

## 2024-07-25 ENCOUNTER — HOSPITAL ENCOUNTER (OUTPATIENT)
Dept: MAMMOGRAPHY | Age: 49
Discharge: HOME | End: 2024-07-25
Attending: NURSE PRACTITIONER
Payer: COMMERCIAL

## 2024-07-25 DIAGNOSIS — Z12.31 ENCOUNTER FOR SCREENING MAMMOGRAM FOR BREAST CANCER: ICD-10-CM

## 2024-07-25 PROCEDURE — 77063 BREAST TOMOSYNTHESIS BI: CPT | Performed by: NURSE PRACTITIONER

## 2024-07-25 PROCEDURE — 77067 SCR MAMMO BI INCL CAD: CPT | Performed by: NURSE PRACTITIONER

## 2024-08-30 ENCOUNTER — TELEPHONE (OUTPATIENT)
Dept: INTERNAL MEDICINE CLINIC | Facility: CLINIC | Age: 49
End: 2024-08-30

## 2024-08-30 DIAGNOSIS — E03.9 ACQUIRED HYPOTHYROIDISM: ICD-10-CM

## 2024-08-30 LAB — TSH: 3.12 MIU/L

## 2024-08-30 RX ORDER — LEVOTHYROXINE SODIUM 25 UG/1
25 TABLET ORAL DAILY
Qty: 30 TABLET | Refills: 0 | Status: SHIPPED | OUTPATIENT
Start: 2024-08-30

## 2024-08-30 NOTE — TELEPHONE ENCOUNTER
Spoke to pt, please see result note from 7/2/24. She was taking a supplement with high dose of biotin in it so it is likely possible this was contributing to slightly elevated TSH. She has been taking same dose of levothyroxine every morning on an empty stomach and has no symptoms oh hypothyroidism. She will repeat TSH now as she has been holding the supplement x2 weeks and she understands she can resume this once she gets lab checked, in future hold x4 days prior to labs. She needed a short term refill of levothyroxine sent in, order placed. Advised we can send future refills based on results. Fyi since I can't edit the result note, thanks!

## 2025-01-02 DIAGNOSIS — E03.9 ACQUIRED HYPOTHYROIDISM: ICD-10-CM

## 2025-01-04 RX ORDER — LEVOTHYROXINE SODIUM 25 UG/1
25 TABLET ORAL DAILY
Qty: 30 TABLET | Refills: 0 | Status: SHIPPED | OUTPATIENT
Start: 2025-01-04

## 2025-02-03 DIAGNOSIS — E03.9 ACQUIRED HYPOTHYROIDISM: ICD-10-CM

## 2025-02-05 RX ORDER — LEVOTHYROXINE SODIUM 25 UG/1
25 TABLET ORAL DAILY
Qty: 30 TABLET | Refills: 0 | Status: SHIPPED | OUTPATIENT
Start: 2025-02-05

## 2025-02-05 NOTE — TELEPHONE ENCOUNTER
Thyroid Medication Protocol Wmicgm1702/03/2025 09:28 AM   Protocol Details TSH in past 12 months    Last TSH value is normal    In person appointment or virtual visit in the past 12 mos or appointment in next 3 mos    Medication is active on med list      2. Acquired hypothyroidism  -Stable.  Continue current management with levothyroxine.  Future Appointments   Date Time Provider Department Center   3/3/2025  9:00 AM Iris Felton APRN [facility] [id number]

## 2025-03-08 DIAGNOSIS — E03.9 ACQUIRED HYPOTHYROIDISM: ICD-10-CM

## 2025-03-11 RX ORDER — LEVOTHYROXINE SODIUM 25 UG/1
TABLET ORAL
Qty: 30 TABLET | Refills: 0 | Status: SHIPPED | OUTPATIENT
Start: 2025-03-11

## 2025-03-11 NOTE — TELEPHONE ENCOUNTER
Last OV relevant to medication: 6/18   Last refill date: 2/5/25     #/refills: 30/0   When pt was asked to return for OV:   Follow up in 1 year or sooner as needed    Upcoming appt/reason:   Future Appointments   Date Time Provider Department Center   9/3/2025  9:00 AM Iris Felton, FRANCISCA [facility] [id number]       Was pt informed of any over due labs:overdue; mcm sent     TSH (mIU/L)   Date Value   08/30/2024 3.12     TSH W/REFLEX TO FT4 (mIU/L)   Date Value   06/15/2023 3.21

## 2025-04-08 DIAGNOSIS — E03.9 ACQUIRED HYPOTHYROIDISM: ICD-10-CM

## 2025-04-08 RX ORDER — LEVOTHYROXINE SODIUM 25 UG/1
TABLET ORAL
Qty: 30 TABLET | Refills: 0 | Status: SHIPPED | OUTPATIENT
Start: 2025-04-08

## 2025-04-08 NOTE — TELEPHONE ENCOUNTER
Requested Prescriptions     Pending Prescriptions Disp Refills    LEVOTHYROXINE 25 MCG Oral Tab [Pharmacy Med Name: Levothyroxine Sodium 25 Mcg Tab Lupi] 30 tablet 0     Sig: TAKE 1 TABLET BY MOUTH DAILY. DUE FOR LAB WORK, NEEDED FOR FURTHER REFILLS.     Last refill 3/11/25 #30  LOV 6/18/24  Future Appointments   None                  Next office visit due 6/18/25  Patient is overdue for TSH - reminder letter sent.

## 2025-05-07 DIAGNOSIS — E03.9 ACQUIRED HYPOTHYROIDISM: ICD-10-CM

## 2025-05-12 RX ORDER — LEVOTHYROXINE SODIUM 25 UG/1
TABLET ORAL
Qty: 30 TABLET | Refills: 0 | OUTPATIENT
Start: 2025-05-12

## 2025-06-06 DIAGNOSIS — E03.9 ACQUIRED HYPOTHYROIDISM: ICD-10-CM

## 2025-06-09 RX ORDER — LEVOTHYROXINE SODIUM 25 UG/1
TABLET ORAL
Qty: 30 TABLET | Refills: 0 | Status: SHIPPED | OUTPATIENT
Start: 2025-06-09

## 2025-06-09 NOTE — TELEPHONE ENCOUNTER
Thyroid Medication Protocol Erblzd3506/06/2025 09:32 AM   Protocol Details TSH in past 12 months    Last TSH value is normal    In person appointment or virtual visit in the past 12 mos or appointment in next 3 mos    Medication is active on med list      2. Acquired hypothyroidism  -Stable.  Continue current management with levothyroxine.  Future Appointments   Date Time Provider Department Center   9/3/2025  9:00 AM Iris Felton, FRANCISCA [facility] [id number]       Short term refill sent but she is due for annual physical exam and overdue for lab, please call to schedule thanks!!

## 2025-06-26 ENCOUNTER — OFFICE VISIT (OUTPATIENT)
Dept: INTERNAL MEDICINE CLINIC | Facility: CLINIC | Age: 50
End: 2025-06-26
Payer: COMMERCIAL

## 2025-06-26 VITALS
HEART RATE: 69 BPM | BODY MASS INDEX: 23.06 KG/M2 | RESPIRATION RATE: 16 BRPM | WEIGHT: 152.19 LBS | OXYGEN SATURATION: 98 % | TEMPERATURE: 98 F | SYSTOLIC BLOOD PRESSURE: 132 MMHG | DIASTOLIC BLOOD PRESSURE: 86 MMHG | HEIGHT: 68 IN

## 2025-06-26 DIAGNOSIS — Z13.220 SCREENING FOR CHOLESTEROL LEVEL: ICD-10-CM

## 2025-06-26 DIAGNOSIS — Z00.00 ENCOUNTER FOR ANNUAL PHYSICAL EXAM: Primary | ICD-10-CM

## 2025-06-26 DIAGNOSIS — G43.109 MIGRAINE WITH AURA AND WITHOUT STATUS MIGRAINOSUS, NOT INTRACTABLE: ICD-10-CM

## 2025-06-26 DIAGNOSIS — E03.9 ACQUIRED HYPOTHYROIDISM: ICD-10-CM

## 2025-06-26 DIAGNOSIS — Z12.11 SCREENING FOR COLON CANCER: ICD-10-CM

## 2025-06-26 DIAGNOSIS — Z13.29 SCREENING FOR THYROID DISORDER: ICD-10-CM

## 2025-06-26 RX ORDER — LEVOTHYROXINE SODIUM 25 UG/1
25 TABLET ORAL
Qty: 30 TABLET | Refills: 0 | Status: SHIPPED | OUTPATIENT
Start: 2025-06-26

## 2025-06-26 RX ORDER — RIZATRIPTAN BENZOATE 10 MG/1
10 TABLET ORAL EVERY 2 HOUR PRN
Qty: 9 TABLET | Refills: 0 | Status: SHIPPED | OUTPATIENT
Start: 2025-06-26

## 2025-06-26 NOTE — PATIENT INSTRUCTIONS
Check with your insurance to verify coverage for the Shingrix vaccine for shingles. Also check to see where you can go to get the vaccine. You can also get a price check at the pharmacy instead.      PCV 21 vaccine recommended- get a price check at pharmacy    Get your labs done. You should be fasting for at least 10 hours. If you take a multivitamin with Biotin or any biotin product it should be held for 3 days prior to getting your labs done.     Continue levothyroxine    Send in the fit kit    See dentist and eye doctor    Continue to see gyne    Get your mammogram    See neurology     Follow up in a year or sooner as needed

## 2025-06-26 NOTE — PROGRESS NOTES
CHIEF COMPLAINT       Annual Physical Exam, other issues    HPI:   Sheeba Tabares is a 49 year old female who presents for a complete physical exam, other issues.     Wt Readings from Last 6 Encounters:   06/26/25 152 lb 3.2 oz (69 kg)   06/18/24 149 lb 1.6 oz (67.6 kg)   10/06/23 150 lb (68 kg)   06/14/23 145 lb (65.8 kg)   11/16/22 147 lb (66.7 kg)   05/03/22 147 lb 9.6 oz (67 kg)     Body mass index is 23.14 kg/m².       Diet and exercise are good. Vaccines reviewed. Wearing seat belt and no texting and driving. Feels safe at home. Pap due. Sees gyne. Needs to schedule. Mammo over due.  Colon cancer screening -did a stool card and will do C scope later this year. No smoking. Moderation of alcohol. Would like to see derm. Labs to be ordered.    Med check- thyroid medication is going well. No SE.     Migraines- worse and rizatriptan did not help in the past. Did not take more then one in a day.     Anxiety and depression- sees psych. Stable on current management.       CHOLESTEROL, TOTAL (mg/dL)   Date Value   07/02/2024 195   06/15/2023 206 (H)   09/02/2022 219 (H)     HDL CHOLESTEROL (mg/dL)   Date Value   07/02/2024 96   06/15/2023 103   09/02/2022 107     LDL-CHOLESTEROL (mg/dL (calc))   Date Value   07/02/2024 85   06/15/2023 86   09/02/2022 97     AST (U/L)   Date Value   07/02/2024 23   06/15/2023 21   09/02/2022 21     ALT (U/L)   Date Value   07/02/2024 14   06/15/2023 15   09/02/2022 14        Current Outpatient Medications   Medication Sig Dispense Refill    LEVOTHYROXINE 25 MCG Oral Tab TAKE 1 TABLET BY MOUTH DAILY. DUE FOR LAB WORK, NEEDED FOR FURTHER REFILLS. 30 tablet 0    ALPRAZOLAM 0.25 MG Oral Tab TAKE 1 OR 2 TABLETS BY MOUTH EVERY 4 TO 6 HOURS AS NEEDED FOR ANXIETY. 120 tablet 0    zolpidem 10 MG Oral Tab Take 1 tablet (10 mg total) by mouth nightly. 30 tablet 3    venlafaxine  MG Oral Capsule SR 24 Hr TAKE 1 CAPSULE BY MOUTH ONCE DAILY 30 capsule 5    busPIRone 5 MG Oral Tab Take 1  tablet (5 mg total) by mouth in the morning and 1 tablet (5 mg total) before bedtime.      Lactobacillus (PROBIOTIC ACIDOPHILUS OR) 2 tablets daily.      Multiple Vitamins-Minerals (MULTIVITAMIN ADULT) Oral Chew Tab 1 tablet daily.      RIZATRIPTAN BENZOATE 10 MG Oral Tab TAKE 1 TABLET BY MOUTH RIGHT AWAY, MAY REPEAT IN 2 HOURS ONCE (Patient taking differently: Take 1 tablet (10 mg total) by mouth as needed for Migraine.) 9 tablet 0    Cetirizine HCl (ZYRTEC OR) Take 1 tablet by mouth as needed.      Multiple Vitamins-Minerals (EMERGEN-C FIVE) Oral Powd Pack Take by mouth.      fluticasone (FLONASE) 50 MCG/ACT Nasal Suspension 2 sprays in each nostril daily 1 Bottle 3      Allergies   Allergen Reactions    Cat Dander [Dander]     Citalopram      Reaction: increased anxiety    Levaquin [Levofloxacin]      Muscle and tendon aches      Past Medical History:    Allergic rhinitis    Anxiety    Chronic sinusitis    4/17/2012: recurrent    Depression    Generalized anxiety disorder    Hip pain    B/L, tendinitis; 7/9/2012: L hip pain-abductor strain to the iliac crest insertion    Hypothyroidism    Insomnia    Major depressive disorder, recurrent episode, severe, without mention of psychotic behavior    Migraine headache    Obsessive-compulsive personality disorder (HCC)    Onychomycosis    chronic-8/13/2009    Postpartum depression    Seasonal allergies    Shoulder injury    right    Superficial phlebitis    thrombosed, lower extremities    Unspecified visual disturbance    ocular migraine    Varicose veins      Past Surgical History:   Procedure Laterality Date    Anesth,surg breast reconstructive  03/2016    Breast implants - both left and right side    Breast reconstruction  2016    Breast inplants….  Date was at least 2015 or 2016?    Breast surgery  2016    revision of bilat breast augmentation    Breast surgery procedure unlisted  1999    breast augmentation    Breast surgery procedure unlisted  2013    breast  augmentation    Implant left  2016    Implant right  2016      2004, 2008    Other surgical history      Multiple breast augmentations starting in  - then (?) - then  (?)      Family History   Problem Relation Age of Onset    Arthritis Father     Diabetes Father         T2 DM    Hypertension Father     Other (Other) Father         cva, MI    Psychiatric Father     Breast Cancer Mother 57        dx age 57    Cancer Mother         Breast cancer    Diabetes Mother         And father….      Social History:   Social History     Socioeconomic History    Marital status:    Tobacco Use    Smoking status: Never    Smokeless tobacco: Never   Vaping Use    Vaping status: Never Used   Substance and Sexual Activity    Alcohol use: Yes     Alcohol/week: 3.0 - 4.0 standard drinks of alcohol     Types: 3 - 4 Standard drinks or equivalent per week     Comment: 2-3 glasses wine weekly    Drug use: No    Sexual activity: Yes     Partners: Male   Other Topics Concern    Caffeine Concern No    Stress Concern No    Weight Concern No    Special Diet No    Exercise No    Seat Belt No          REVIEW OF SYSTEMS:   GENERAL: feels well otherwise  SKIN: no complaint of any unusual skin lesions  EYES: no complaint of blurred vision or double vision  HEENT: no complaint of nasal congestion, sinus pain or ST  LUNGS: no complaint of shortness of breath with exertion  CARDIOVASCULAR: no complaint of chest pain on exertion  GI: no complaint of pain, denies heartburn  : No complains of dysuria or vaginal discharge  MUSCULOSKELETAL: no complaint of back pain  NEURO: no complaint of headaches  PSYCHE: Sees psychiatry.   HEMATOLOGIC: denies hx of anemia    EXAM:   /86 (BP Location: Left arm, Patient Position: Sitting, Cuff Size: adult)   Pulse 69   Temp 97.7 °F (36.5 °C) (Temporal)   Resp 16   Ht 5' 8\" (1.727 m)   Wt 152 lb 3.2 oz (69 kg)   LMP 2024   SpO2 98%   BMI 23.14 kg/m²   Body mass  index is 23.14 kg/m².   GENERAL: well developed, well nourished,in no apparent distress  SKIN: no rashes, no suspicious lesions  HEENT: atraumatic, normocephalic, ears are clear   EYES:PERRLA, EOMI, conjunctiva are clear  NECK: supple,no adenopathy, no thyromegaly  BREAST: per gyne  LUNGS: clear to auscultation  CARDIO: RRR without murmur  GI: no tenderness or masses   :  per gyne  MUSCULOSKELETAL: No obvious joint swelling. Full strength and ROM to arms and legs. Has possible malalignment to hips.  EXTREMITIES: no edema or calve tenderness   NEURO: Oriented times three,cranial nerves are intact,motor and sensory are grossly intact    LABS:     Lab Results   Component Value Date    WBC 4.3 07/02/2024    RBC 4.04 07/02/2024    HGB 13.3 07/02/2024    HCT 40.1 07/02/2024    MCV 99.3 07/02/2024    MCH 32.9 07/02/2024    MCHC 33.2 07/02/2024    RDW 11.7 07/02/2024     07/02/2024      Lab Results   Component Value Date    GLU 98 07/02/2024    BUN 16 07/02/2024    BUNCREA SEE NOTE: 07/02/2024    CREATSERUM 0.95 07/02/2024    ANIONGAP 2 04/12/2021    GFR 70 11/27/2017    GFRNAA 69 04/12/2021    GFRAA 79 04/12/2021    CA 9.1 07/02/2024    OSMOCALC 287 04/12/2021    ALKPHO 38 07/02/2024    AST 23 07/02/2024    ALT 14 07/02/2024    BILT 0.3 07/02/2024    TP 6.8 07/02/2024    ALB 4.2 07/02/2024    GLOBULIN 2.6 07/02/2024    AGRATIO 1.6 07/02/2024     07/02/2024    K 5.1 07/02/2024     07/02/2024    CO2 30 07/02/2024      Lab Results   Component Value Date    CHOLEST 195 07/02/2024    TRIG 56 07/02/2024    HDL 96 07/02/2024    LDL 85 07/02/2024    VLDL 11 04/12/2021    TCHDLRATIO 2.0 07/02/2024    NONHDLC 99 07/02/2024      Lab Results   Component Value Date    TSH 3.12 08/30/2024    TSHT4 3.21 06/15/2023      Lab Results   Component Value Date     06/26/2019    A1C 5.7 (H) 06/26/2019         ASSESSMENT AND PLAN:   1. Encounter for annual physical exam  - Sheeba Tabares is a 47 year old female  who presents for a complete physical exam.  Self breast exam encouraged. Mammo ordered. Pap UTD- due. Will see gyne. Pt' s weight is Body mass index is 21.41 kg/m². Recommended regular exercise. Labs ordered. Vaccines reviewed. C scope referral given. Derm referral given.  - CBC WITH DIFFERENTIAL WITH PLATELET; Future  - COMP METABOLIC PANEL (14); Future  - CBC WITH DIFFERENTIAL WITH PLATELET  - COMP METABOLIC PANEL (14)    2. Acquired hypothyroidism  -  Continue med  - repeat lab  - levothyroxine 25 MCG Oral Tab; Take 1 tablet (25 mcg total) by mouth before breakfast.  Dispense: 30 tablet; Refill: 0    3. Migraine with aura and without status migrainosus, not intractable  - restart rizatriptan and monitor  - see neuro  - Neuro Referral - In Network  - Rizatriptan Benzoate 10 MG Oral Tab; Take 1 tablet (10 mg total) by mouth every 2 (two) hours as needed for Migraine. Maximum of 30 mg per day  Dispense: 9 tablet; Refill: 0    4. Screening for cholesterol level  - Lipid Panel    5. Screening for thyroid disorder  - TSH W Reflex To Free T4       Follow up in 1 year or sooner as needed  The patient indicates understanding of these issues and agrees to the plan.

## 2025-08-05 DIAGNOSIS — E03.9 ACQUIRED HYPOTHYROIDISM: ICD-10-CM

## 2025-08-06 LAB
ABSOLUTE BASOPHILS: 39 CELLS/UL (ref 0–200)
ABSOLUTE EOSINOPHILS: 152 CELLS/UL (ref 15–500)
ABSOLUTE LYMPHOCYTES: 2729 CELLS/UL (ref 850–3900)
ABSOLUTE MONOCYTES: 564 CELLS/UL (ref 200–950)
ABSOLUTE NEUTROPHILS: 1416 CELLS/UL (ref 1500–7800)
ALBUMIN/GLOBULIN RATIO: 1.9 (CALC) (ref 1–2.5)
ALBUMIN: 4.4 G/DL (ref 3.6–5.1)
ALKALINE PHOSPHATASE: 37 U/L (ref 37–153)
ALT: 17 U/L (ref 6–29)
AST: 25 U/L (ref 10–35)
BASOPHILS: 0.8 %
BILIRUBIN, TOTAL: 0.3 MG/DL (ref 0.2–1.2)
BUN: 13 MG/DL (ref 7–25)
CALCIUM: 9.3 MG/DL (ref 8.6–10.4)
CARBON DIOXIDE: 30 MMOL/L (ref 20–32)
CHLORIDE: 106 MMOL/L (ref 98–110)
CHOL/HDLC RATIO: 2.5 (CALC)
CHOLESTEROL, TOTAL: 234 MG/DL
CREATININE: 0.8 MG/DL (ref 0.5–1.03)
EGFR: 89 ML/MIN/1.73M2
EOSINOPHILS: 3.1 %
GLOBULIN: 2.3 G/DL (CALC) (ref 1.9–3.7)
GLUCOSE: 90 MG/DL (ref 65–99)
HDL CHOLESTEROL: 94 MG/DL
HEMATOCRIT: 42.3 % (ref 35–45)
HEMOGLOBIN: 13.4 G/DL (ref 11.7–15.5)
LDL-CHOLESTEROL: 115 MG/DL (CALC)
LYMPHOCYTES: 55.7 %
MCH: 32.1 PG (ref 27–33)
MCHC: 31.7 G/DL (ref 32–36)
MCV: 101.4 FL (ref 80–100)
MONOCYTES: 11.5 %
MPV: 10.1 FL (ref 7.5–12.5)
NEUTROPHILS: 28.9 %
NON-HDL CHOLESTEROL: 140 MG/DL (CALC)
PLATELET COUNT: 250 THOUSAND/UL (ref 140–400)
POTASSIUM: 4.9 MMOL/L (ref 3.5–5.3)
PROTEIN, TOTAL: 6.7 G/DL (ref 6.1–8.1)
RDW: 11.7 % (ref 11–15)
RED BLOOD CELL COUNT: 4.17 MILLION/UL (ref 3.8–5.1)
SODIUM: 144 MMOL/L (ref 135–146)
TRIGLYCERIDES: 133 MG/DL
TSH W/REFLEX TO FT4: 3.54 MIU/L
WHITE BLOOD CELL COUNT: 4.9 THOUSAND/UL (ref 3.8–10.8)

## 2025-08-06 RX ORDER — LEVOTHYROXINE SODIUM 25 UG/1
25 TABLET ORAL
Qty: 90 TABLET | Refills: 3 | Status: SHIPPED | OUTPATIENT
Start: 2025-08-06

## (undated) DIAGNOSIS — E03.9 ACQUIRED HYPOTHYROIDISM: ICD-10-CM

## (undated) NOTE — MR AVS SNAPSHOT
[address]  [address] [facility] [address] [phone]               Thank you for choosing us for your health care visit with Twila Mccord DO.   We are glad to serve you and happy to provide you with th TAKE ONE TO TWO TABLETS BY MOUTH EVERY 4 TO 6 HOURS AS NEEDED   Commonly known as:  XANAX           Fluticasone Propionate 50 MCG/ACT Susp   2 sprays in each nostril daily   Commonly known as:  FLONASE           Levothyroxine Sodium 25 MCG Tabs   TAKE 1 TA

## (undated) NOTE — ED AVS SNAPSHOT
Rhoda Homans   MRN: [id number]    Department:  [facility] Emergency Department   Date of Visit:  11/27/2017           Disclosure     Insurance plans vary and the physician(s) referred by the ER may not be covered by your plan.  Please conta tell this physician (or your personal doctor if your instructions are to return to your personal doctor) about any new or lasting problems. The primary care or specialist physician will see patients referred from the [facility] Emergency Department.  Kena King

## (undated) NOTE — LETTER
05/18/21        [address] [place]      Dear Ana María Lim,    This is a friendly reminder to let you know it's time to schedule your Annual Mammogram.  To schedule an appointment, please go to [facility] under Menu/Schedul